# Patient Record
Sex: FEMALE | Employment: OTHER | URBAN - METROPOLITAN AREA
[De-identification: names, ages, dates, MRNs, and addresses within clinical notes are randomized per-mention and may not be internally consistent; named-entity substitution may affect disease eponyms.]

---

## 2017-07-05 ENCOUNTER — OFFICE VISIT (OUTPATIENT)
Dept: FAMILY MEDICINE | Facility: CLINIC | Age: 57
End: 2017-07-05
Payer: COMMERCIAL

## 2017-07-05 ENCOUNTER — RADIANT APPOINTMENT (OUTPATIENT)
Dept: GENERAL RADIOLOGY | Facility: CLINIC | Age: 57
End: 2017-07-05
Attending: FAMILY MEDICINE
Payer: COMMERCIAL

## 2017-07-05 VITALS
DIASTOLIC BLOOD PRESSURE: 73 MMHG | SYSTOLIC BLOOD PRESSURE: 111 MMHG | HEART RATE: 70 BPM | OXYGEN SATURATION: 98 % | WEIGHT: 150 LBS | BODY MASS INDEX: 24.99 KG/M2 | TEMPERATURE: 97.1 F | HEIGHT: 65 IN

## 2017-07-05 DIAGNOSIS — Z12.31 ENCOUNTER FOR SCREENING MAMMOGRAM FOR BREAST CANCER: ICD-10-CM

## 2017-07-05 DIAGNOSIS — S91.331A PUNCTURE WOUND OF FOOT, RIGHT, INITIAL ENCOUNTER: ICD-10-CM

## 2017-07-05 DIAGNOSIS — L03.115 CELLULITIS OF FOOT, RIGHT: ICD-10-CM

## 2017-07-05 DIAGNOSIS — M79.671 RIGHT FOOT PAIN: Primary | ICD-10-CM

## 2017-07-05 PROCEDURE — 99203 OFFICE O/P NEW LOW 30 MIN: CPT | Performed by: FAMILY MEDICINE

## 2017-07-05 PROCEDURE — 73620 X-RAY EXAM OF FOOT: CPT | Mod: RT

## 2017-07-05 RX ORDER — BUPROPION HYDROCHLORIDE 150 MG/1
150 TABLET ORAL
COMMUNITY
Start: 2017-04-04 | End: 2017-10-13

## 2017-07-05 RX ORDER — IBUPROFEN 200 MG
200-400 TABLET ORAL
COMMUNITY
End: 2021-03-11

## 2017-07-05 RX ORDER — CEPHALEXIN 500 MG/1
500 CAPSULE ORAL 2 TIMES DAILY
Qty: 20 CAPSULE | Refills: 0 | Status: SHIPPED | OUTPATIENT
Start: 2017-07-05 | End: 2017-07-15

## 2017-07-05 RX ORDER — PSEUDOEPHEDRINE HCL 30 MG
TABLET ORAL
COMMUNITY
End: 2018-11-23

## 2017-07-05 RX ORDER — FLUTICASONE PROPIONATE 50 MCG
2 SPRAY, SUSPENSION (ML) NASAL
COMMUNITY
Start: 2013-08-20 | End: 2023-02-03

## 2017-07-05 ASSESSMENT — ANXIETY QUESTIONNAIRES
5. BEING SO RESTLESS THAT IT IS HARD TO SIT STILL: NOT AT ALL
3. WORRYING TOO MUCH ABOUT DIFFERENT THINGS: NOT AT ALL
1. FEELING NERVOUS, ANXIOUS, OR ON EDGE: NOT AT ALL
IF YOU CHECKED OFF ANY PROBLEMS ON THIS QUESTIONNAIRE, HOW DIFFICULT HAVE THESE PROBLEMS MADE IT FOR YOU TO DO YOUR WORK, TAKE CARE OF THINGS AT HOME, OR GET ALONG WITH OTHER PEOPLE: NOT DIFFICULT AT ALL
6. BECOMING EASILY ANNOYED OR IRRITABLE: NOT AT ALL
7. FEELING AFRAID AS IF SOMETHING AWFUL MIGHT HAPPEN: NOT AT ALL
GAD7 TOTAL SCORE: 0
2. NOT BEING ABLE TO STOP OR CONTROL WORRYING: NOT AT ALL

## 2017-07-05 ASSESSMENT — PATIENT HEALTH QUESTIONNAIRE - PHQ9: 5. POOR APPETITE OR OVEREATING: NOT AT ALL

## 2017-07-05 NOTE — MR AVS SNAPSHOT
After Visit Summary   7/5/2017    Taisha Yanez    MRN: [id number]           Patient Information     Date Of Birth          1960        Visit Information        Provider Department      7/5/2017 11:00 AM Amparo Silveira MD [facility]        Today's Diagnoses     Right foot pain    -  1    Puncture wound of foot, right, initial encounter        Cellulitis of foot, right        Encounter for screening mammogram for breast cancer          Care Instructions      Discharge Instructions for Cellulitis  You have been diagnosed with cellulitis. This is an infection in the deepest layer of the skin. In some cases, the infection also affects the muscle. Cellulitis is caused by bacteria. The bacteria can enter the body through broken skin. This can happen with a cut, scratch, animal bite, or an insect bite that has been scratched. You may have been treated in the hospital with antibiotics and fluids. You will likely be given a prescription for antibiotics to take at home. This sheet will help you take care of yourself at home.  Home care  When you are home:    Take the prescribed antibiotic medicine you are given as directed until it is gone. Take it even if you feel better. It treats the infection and stops it from returning. Not taking all the medicine can make future infections hard to treat.    Keep the infected area clean.    When possible, raise the infected area above the level of your heart. This helps keep swelling down.    Talk with your healthcare provider if you are in pain. Ask what kind of over-the-counter medicine you can take for pain.    Apply clean bandages as advised.    Take your temperature once a day for a week.    Wash your hands often to prevent spreading the infection.  In the future, wash your hands before and after you touch cuts, scratches, or bandages. This will help prevent infection.   When to call your healthcare provider  Call your healthcare provider  immediately if you have any of the following:    Difficulty or pain when moving the joints above or below the infected area    Discharge or pus draining from the area    Fever of 100.4 F (38 C) or higher, or as directed by your healthcare provider    Pain that gets worse in or around the infected     Redness that gets worse in or around the infected area, particularly if the area of redness expands to a wider area    Shaking chills    Swelling of the infected area    Vomiting   Date Last Reviewed: 8/1/2016 2000-2017 [employer]. [address] [place] [address]. All rights reserved. This information is not intended as a substitute for professional medical care. Always follow your healthcare professional's instructions.                Follow-ups after your visit        Follow-up notes from your care team     Return if symptoms worsen or fail to improve, for Physical Exam at earliest convenience.      Future tests that were ordered for you today     Open Future Orders        Priority Expected Expires Ordered    *MA Screening Digital Bilateral Routine  7/5/2018 7/5/2017            Who to contact     Normal or non-critical lab and imaging results will be communicated to you by [employer], letter or phone within 4 business days after the clinic has received the results. If you do not hear from us within 7 days, please contact the clinic through [employer] or phone. If you have a critical or abnormal lab result, we will notify you by phone as soon as possible.  Submit refill requests through [employer] or call your pharmacy and they will forward the refill request to us. Please allow 3 business days for your refill to be completed.          If you need to speak with a  for additional information , please call: [phone]             Additional Information About Your Visit        [employer] Information     [employer] lets you send messages to your doctor, view your test results, renew your  "prescriptions, schedule appointments and more. To sign up, go to [email or web address] . Click on \"Log in\" on the left side of the screen, which will take you to the Welcome page. Then click on \"Sign up Now\" on the right side of the page.     You will be asked to enter the access code listed below, as well as some personal information. Please follow the directions to create your username and password.     Your access code is: [email or web address]  Expires: 10/3/2017 12:09 PM     Your access code will  in 90 days. If you need help or a new code, please call your [place] clinic or [phone].        Care EveryWhere ID     This is your Care EveryWhere ID. This could be used by other organizations to access your [place] medical records  [id number]        Your Vitals Were     Pulse Temperature Height Pulse Oximetry Breastfeeding? BMI (Body Mass Index)    70 97.1  F (36.2  C) (Tympanic) 5' 5\" (1.651 m) 98% No 24.96 kg/m2       Blood Pressure from Last 3 Encounters:   17 111/73    Weight from Last 3 Encounters:   17 150 lb (68 kg)              We Performed the Following     DEPRESSION ACTION PLAN (DAP)     XR Foot Right 2 Views          Today's Medication Changes          These changes are accurate as of: 17 12:09 PM.  If you have any questions, ask your nurse or doctor.               Start taking these medicines.        Dose/Directions    cephALEXin 500 MG capsule   Commonly known as:  KEFLEX   Used for:  Cellulitis of foot, right   Started by:  Amparo Silveira MD        Dose:  500 mg   Take 1 capsule (500 mg) by mouth 2 times daily for 10 days   Quantity:  20 capsule   Refills:  0            Where to get your medicines      These medications were sent to [id number] - [place] [[facility] [place] [address] [facility]  [address]  [place] [address]     Phone:  [phone]     cephALEXin 500 MG capsule                Primary Care Provider Office Phone # Fax #    [facility] " [facility] [phone]       [address] [facility] [facility] [employer] [id number]        Equal Access to Services     YARELY HAYNES : Hadii aad ku hadradha Junior, waaaronda luqrufina, lolitata kakandyda wallace, hugh mendezadan rose. So [facility] [phone].    ATENCIÓN: Si habla español, tiene a saini disposición servicios gratuitos de asistencia lingüística. Llame al [phone].    We comply with applicable federal civil rights laws and [place] laws. We do not discriminate on the basis of race, color, national origin, age, disability sex, sexual orientation or gender identity.            Thank you!     Thank you for choosing [facility]  for your care. Our goal is always to provide you with excellent care. Hearing back from our patients is one way we can continue to improve our services. Please take a few minutes to complete the written survey that you may receive in the mail after your visit with us. Thank you!             Your Updated Medication List - Protect others around you: Learn how to safely use, store and throw away your medicines at www.disposemymeds.org.          This list is accurate as of: 7/5/17 12:09 PM.  Always use your most recent med list.                   Brand Name Dispense Instructions for use Diagnosis    buPROPion 150 MG 24 hr tablet    WELLBUTRIN XL     Take 150 mg by mouth    Encounter for screening mammogram for breast cancer       cephALEXin 500 MG capsule    KEFLEX    20 capsule    Take 1 capsule (500 mg) by mouth 2 times daily for 10 days    Cellulitis of foot, right       FLUoxetine 20 MG capsule    PROzac     Take 40 mg by mouth    Encounter for screening mammogram for breast cancer       fluticasone 50 MCG/ACT spray    FLONASE     2 sprays    Encounter for screening mammogram for breast cancer       ibuprofen 200 MG tablet    ADVIL/MOTRIN     Take 200-400 mg by mouth    Encounter for screening mammogram for breast cancer       pseudoePHEDrine  30 MG tablet    SUDAFED      Encounter for screening mammogram for breast cancer

## 2017-07-05 NOTE — NURSING NOTE
"Chief Complaint   Patient presents with     Puncture Wound       Initial /73  Pulse 70  Temp 97.1  F (36.2  C) (Tympanic)  Ht 5' 5\" (1.651 m)  Wt 150 lb (68 kg)  SpO2 98%  Breastfeeding? No  BMI 24.96 kg/m2 Estimated body mass index is 24.96 kg/(m^2) as calculated from the following:    Height as of this encounter: 5' 5\" (1.651 m).    Weight as of this encounter: 150 lb (68 kg).  Medication Reconciliation: complete     Health Maintenance:  Pt report she no longer needs to complete pap smears; due to hysterectomy.  Mammogram was last completed 2015, is aware she is over due; usually has done between August and September.  Pt accepted having referral order placed for mammogram.  Colonoscopy done 2009- report available in care everywhere- is up to date; chart was routed to be abstracted.      Olga Rossi MA      "

## 2017-07-05 NOTE — LETTER
My Depression Action Plan  Name: Taisha Yanez   Date of Birth 1960  Date: 7/5/2017    [facility]   My clinic: [facility] [place]  [address]  [place] [id number] [phone]          GREEN    ZONE   Good Control    What it looks like:     Things are going generally well. You have normal up s and down s. You may even feel depressed from time to time, but bad moods usually last less than a day.   What you need to do:  1. Continue to care for yourself (see self care plan)  2. Check your depression survival kit and update it as needed  3. Follow your physician s recommendations including any medication.  4. Do not stop taking medication unless you consult with your physician first.           YELLOW         ZONE Getting Worse    What it looks like:     Depression is starting to interfere with your life.     It may be hard to get out of bed; you may be starting to isolate yourself from others.    Symptoms of depression are starting to last most all day and this has happened for several days.     You may have suicidal thoughts but they are not constant.   What you need to do:     1. Call your care team, your response to treatment will improve if you keep your care team informed of your progress. Yellow periods are signs an adjustment may need to be made.     2. Continue your self-care, even if you have to fake it!    3. Talk to someone in your support network    4. Open up your depression survival kit           RED    ZONE Medical Alert - Get Help    What it looks like:     Depression is seriously interfering with your life.     You may experience these or other symptoms: You can t get out of bed most days, can t work or engage in other necessary activities, you have trouble taking care of basic hygiene, or basic responsibilities, thoughts of suicide or death that will not go away, self-injurious behavior.     What you need to do:  1. Call  your care team and request a same-day appointment. If they are not available (weekends or after hours) call your local crisis line, emergency room or 911.      Electronically signed by: Olga Rossi, July 5, 2017    Depression Self Care Plan / Survival Kit    Self-Care for Depression  Here s the deal. Your body and mind are really not as separate as most people think.  What you do and think affects how you feel and how you feel influences what you do and think. This means if you do things that people who feel good do, it will help you feel better.  Sometimes this is all it takes.  There is also a place for medication and therapy depending on how severe your depression is, so be sure to consult with your medical provider and/ or Behavioral Health Consultant if your symptoms are worsening or not improving.     In order to better manage my stress, I will:    Exercise  Get some form of exercise, every day. This will help reduce pain and release endorphins, the  feel good  chemicals in your brain. This is almost as good as taking antidepressants!  This is not the same as joining a gym and then never going! (they count on that by the way ) It can be as simple as just going for a walk or doing some gardening, anything that will get you moving.      Hygiene   Maintain good hygiene (Get out of bed in the morning, Make your bed, Brush your teeth, Take a shower, and Get dressed like you were going to work, even if you are unemployed).  If your clothes don't fit try to get ones that do.    Diet  I will strive to eat foods that are good for me, drink plenty of water, and avoid excessive sugar, caffeine, alcohol, and other mood-altering substances.  Some foods that are helpful in depression are: complex carbohydrates, B vitamins, flaxseed, fish or fish oil, fresh fruits and vegetables.    Psychotherapy  I agree to participate in Individual Therapy (if recommended).    Medication  If prescribed medications, I agree to  take them.  Missing doses can result in serious side effects.  I understand that drinking alcohol, or other illicit drug use, may cause potential side effects.  I will not stop my medication abruptly without first discussing it with my provider.    Staying Connected With Others  I will stay in touch with my friends, family members, and my primary care provider/team.    Use your imagination  Be creative.  We all have a creative side; it doesn t matter if it s oil painting, sand castles, or mud pies! This will also kick up the endorphins.    Witness Beauty  (AKA stop and smell the roses) Take a look outside, even in mid-winter. Notice colors, textures. Watch the squirrels and birds.     Service to others  Be of service to others.  There is always someone else in need.  By helping others we can  get out of ourselves  and remember the really important things.  This also provides opportunities for practicing all the other parts of the program.    Humor  Laugh and be silly!  Adjust your TV habits for less news and crime-drama and more comedy.    Control your stress  Try breathing deep, massage therapy, biofeedback, and meditation. Find time to relax each day.     My support system    Clinic Contact:  Phone number:    Contact 1:  Phone number:    Contact 2:  Phone number:    Christianity/:  Phone number:    Therapist:  Phone number:    Local crisis center:    Phone number:    Other community support:  Phone number:

## 2017-07-05 NOTE — PROGRESS NOTES
"  SUBJECTIVE:                                                    Taisha Yanez is a 56 year old female who presents to clinic today for the following health issues:    New to the clinic.     Puncture Wound  Stepped on nail, bottom of right foot x1 day ago   Swelling started today, pain with pressure, stiffness in toes, and redness.   TDAP is up to date: 3/17/15     States that she was wearing flip flops when it happened and the nail went through the flip flop and punctured the bottom of her foot. Thinks that about 3 cm of the nail went through. Was able to walk away from the scene without the nail without the nail embedded in the skin.     Denies having any fever or chills.     HEALTH CARE MAINTENANCE: new to the clinic; overdue for screening tests.     Problem list and histories reviewed & adjusted, as indicated.  Additional history: as documented    Current Outpatient Prescriptions   Medication Sig Dispense Refill     FLUoxetine (PROZAC) 20 MG capsule Take 40 mg by mouth       buPROPion (WELLBUTRIN XL) 150 MG 24 hr tablet Take 150 mg by mouth       pseudoePHEDrine (SUDAFED) 30 MG tablet        fluticasone (FLONASE) 50 MCG/ACT spray 2 sprays       ibuprofen (ADVIL/MOTRIN) 200 MG tablet Take 200-400 mg by mouth       cephALEXin (KEFLEX) 500 MG capsule Take 1 capsule (500 mg) by mouth 2 times daily for 10 days 20 capsule 0     Allergies   Allergen Reactions     Codeine        Reviewed and updated as needed this visit by clinical staff       Reviewed and updated as needed this visit by Provider         ROS:  Constitutional, HEENT, cardiovascular, pulmonary, gi and gu systems are negative, except as otherwise noted.    OBJECTIVE:     /73  Pulse 70  Temp 97.1  F (36.2  C) (Tympanic)  Ht 5' 5\" (1.651 m)  Wt 150 lb (68 kg)  SpO2 98%  Breastfeeding? No  BMI 24.96 kg/m2  Body mass index is 24.96 kg/(m^2).  GENERAL: healthy, alert and no distress  RIGHT FOOT: Puncture wound on the plantar aspect of the foot " with tenderness to palpation but no discharge. Erythematous anterior aspect of the foot, warm to touch. Pedal pulse present. No cyanosis. FROM    Diagnostic Test Results:  Reviewed and discussed with patient prior to discharge.  Results for orders placed or performed in visit on 07/05/17   XR Foot Right 2 Views    Narrative    XR FOOT RT 2 VW 7/5/2017 11:45 AM    COMPARISON: None.    HISTORY: Foot pain.      Impression    IMPRESSION: No fractures are seen in the right foot. Joints are  preserved and in normal alignment. Achilles enthesopathy is noted.    SINDHU BHARDWAJ         ASSESSMENT/PLAN:     Taisha was seen today for puncture wound.    Diagnoses and all orders for this visit:    Right foot pain  -     XR Foot Right 2 Views    Puncture wound of foot, right, initial encounter  -     XR Foot Right 2 Views    Cellulitis of foot, right  -     cephALEXin (KEFLEX) 500 MG capsule; Take 1 capsule (500 mg) by mouth 2 times daily for 10 days    Patient education and Handout with home care instructions given    Encounter for screening mammogram for breast cancer  -     *MA Screening Digital Bilateral; Future    Other orders  -     DEPRESSION ACTION PLAN (DAP)           Follow up if symptoms fail to improve or worsen.      The patient was in agreement with the plan today and had no questions or concerns prior to leaving the clinic.    Schedule a Physical Exam at earliest convenience.       Amparo Silveira MD  [facility]

## 2017-07-05 NOTE — Clinical Note
Please abstract the following data from this visit with this patient into the appropriate field in Epic:  Colonoscopy done on this date: 5/9/09 (approximately), by this group: [facility], results were NORMAL- REPEAT Q10Y.   Colonoscopy is up to date; please abstract.  Thank you.   Olga Rossi MA

## 2017-07-05 NOTE — PATIENT INSTRUCTIONS
Discharge Instructions for Cellulitis  You have been diagnosed with cellulitis. This is an infection in the deepest layer of the skin. In some cases, the infection also affects the muscle. Cellulitis is caused by bacteria. The bacteria can enter the body through broken skin. This can happen with a cut, scratch, animal bite, or an insect bite that has been scratched. You may have been treated in the hospital with antibiotics and fluids. You will likely be given a prescription for antibiotics to take at home. This sheet will help you take care of yourself at home.  Home care  When you are home:    Take the prescribed antibiotic medicine you are given as directed until it is gone. Take it even if you feel better. It treats the infection and stops it from returning. Not taking all the medicine can make future infections hard to treat.    Keep the infected area clean.    When possible, raise the infected area above the level of your heart. This helps keep swelling down.    Talk with your healthcare provider if you are in pain. Ask what kind of over-the-counter medicine you can take for pain.    Apply clean bandages as advised.    Take your temperature once a day for a week.    Wash your hands often to prevent spreading the infection.  In the future, wash your hands before and after you touch cuts, scratches, or bandages. This will help prevent infection.   When to call your healthcare provider  Call your healthcare provider immediately if you have any of the following:    Difficulty or pain when moving the joints above or below the infected area    Discharge or pus draining from the area    Fever of 100.4 F (38 C) or higher, or as directed by your healthcare provider    Pain that gets worse in or around the infected     Redness that gets worse in or around the infected area, particularly if the area of redness expands to a wider area    Shaking chills    Swelling of the infected area    Vomiting   Date Last Reviewed:  8/1/2016 2000-2017 [employer]. [address] [place] [address], [place] [address]. All rights reserved. This information is not intended as a substitute for professional medical care. Always follow your healthcare professional's instructions.

## 2017-07-06 ASSESSMENT — PATIENT HEALTH QUESTIONNAIRE - PHQ9: SUM OF ALL RESPONSES TO PHQ QUESTIONS 1-9: 2

## 2017-07-06 ASSESSMENT — ANXIETY QUESTIONNAIRES: GAD7 TOTAL SCORE: 0

## 2017-10-13 ENCOUNTER — OFFICE VISIT (OUTPATIENT)
Dept: FAMILY MEDICINE | Facility: CLINIC | Age: 57
End: 2017-10-13
Payer: COMMERCIAL

## 2017-10-13 VITALS
SYSTOLIC BLOOD PRESSURE: 112 MMHG | HEIGHT: 65 IN | DIASTOLIC BLOOD PRESSURE: 77 MMHG | TEMPERATURE: 98.3 F | HEART RATE: 72 BPM

## 2017-10-13 DIAGNOSIS — Z00.00 ROUTINE GENERAL MEDICAL EXAMINATION AT A HEALTH CARE FACILITY: Primary | ICD-10-CM

## 2017-10-13 DIAGNOSIS — Z12.31 ENCOUNTER FOR SCREENING MAMMOGRAM FOR BREAST CANCER: ICD-10-CM

## 2017-10-13 DIAGNOSIS — G56.20 LESION OF ULNAR NERVE, UNSPECIFIED LATERALITY: ICD-10-CM

## 2017-10-13 DIAGNOSIS — R10.9 ABDOMINAL SPASMS: ICD-10-CM

## 2017-10-13 DIAGNOSIS — F32.1 MODERATE MAJOR DEPRESSION (H): ICD-10-CM

## 2017-10-13 DIAGNOSIS — M26.609 TMJ (TEMPOROMANDIBULAR JOINT SYNDROME): ICD-10-CM

## 2017-10-13 DIAGNOSIS — H92.01 OTALGIA, RIGHT: ICD-10-CM

## 2017-10-13 DIAGNOSIS — Z23 NEED FOR PROPHYLACTIC VACCINATION AND INOCULATION AGAINST INFLUENZA: ICD-10-CM

## 2017-10-13 DIAGNOSIS — J32.9 CHRONIC SINUSITIS, UNSPECIFIED LOCATION: ICD-10-CM

## 2017-10-13 DIAGNOSIS — F41.9 ANXIETY: ICD-10-CM

## 2017-10-13 LAB — HCV AB SERPL QL IA: NONREACTIVE

## 2017-10-13 PROCEDURE — 90471 IMMUNIZATION ADMIN: CPT | Performed by: PHYSICIAN ASSISTANT

## 2017-10-13 PROCEDURE — 99213 OFFICE O/P EST LOW 20 MIN: CPT | Mod: 25 | Performed by: PHYSICIAN ASSISTANT

## 2017-10-13 PROCEDURE — 86803 HEPATITIS C AB TEST: CPT | Performed by: PHYSICIAN ASSISTANT

## 2017-10-13 PROCEDURE — 99396 PREV VISIT EST AGE 40-64: CPT | Mod: 25 | Performed by: PHYSICIAN ASSISTANT

## 2017-10-13 PROCEDURE — 90686 IIV4 VACC NO PRSV 0.5 ML IM: CPT | Performed by: PHYSICIAN ASSISTANT

## 2017-10-13 PROCEDURE — 36415 COLL VENOUS BLD VENIPUNCTURE: CPT | Performed by: PHYSICIAN ASSISTANT

## 2017-10-13 RX ORDER — BUPROPION HYDROCHLORIDE 150 MG/1
150 TABLET ORAL EVERY MORNING
Qty: 90 TABLET | Refills: 1 | Status: SHIPPED | OUTPATIENT
Start: 2017-10-13 | End: 2018-04-16

## 2017-10-13 RX ORDER — CYCLOBENZAPRINE HCL 10 MG
10 TABLET ORAL 2 TIMES DAILY PRN
Qty: 42 TABLET | Refills: 0 | Status: SHIPPED | OUTPATIENT
Start: 2017-10-13 | End: 2019-12-16

## 2017-10-13 RX ORDER — DICYCLOMINE HCL 20 MG
TABLET ORAL
Qty: 90 TABLET | Refills: 1 | Status: SHIPPED | OUTPATIENT
Start: 2017-10-13 | End: 2019-12-16

## 2017-10-13 RX ORDER — FLUOXETINE 40 MG/1
40 CAPSULE ORAL DAILY
Qty: 90 CAPSULE | Refills: 1 | Status: SHIPPED | OUTPATIENT
Start: 2017-10-13 | End: 2018-04-16

## 2017-10-13 RX ORDER — DICYCLOMINE HCL 20 MG
TABLET ORAL
COMMUNITY
Start: 2015-02-04 | End: 2017-10-13

## 2017-10-13 RX ORDER — CYCLOBENZAPRINE HCL 10 MG
10 TABLET ORAL
COMMUNITY
Start: 2016-03-31 | End: 2017-10-13

## 2017-10-13 NOTE — PROGRESS NOTES
Injectable Influenza Immunization Documentation    1.  Is the person to be vaccinated sick today?   No    2. Does the person to be vaccinated have an allergy to a component   of the vaccine?   No    3. Has the person to be vaccinated ever had a serious reaction   to influenza vaccine in the past?   No    4. Has the person to be vaccinated ever had Guillain-Barré syndrome?   No    Form completed by Kylee Johnson MA

## 2017-10-13 NOTE — Clinical Note
Please abstract the following data from this visit with this patient into the appropriate field in Epic:  Colonoscopy done on this date: 5/4/2009 (approximately), by this group: [facility] , results were Normal Colonoscopy every 10 years.

## 2017-10-13 NOTE — PROGRESS NOTES
SUBJECTIVE:   CC: Taisha Yanez is an 56 year old woman who presents for preventive health visit.     Healthy Habits:    Do you get at least three servings of calcium containing foods daily (dairy, green leafy vegetables, etc.)? yes    Amount of exercise or daily activities, outside of work: 5 day(s) per week    Problems taking medications regularly No    Medication side effects: No    Have you had an eye exam in the past two years? unsure    Do you see a dentist twice per year? no    Do you have sleep apnea, excessive snoring or daytime drowsiness?yes, waking during the night and tightness in muscles and ache in feet       Pt is having right ear pain and is suspecting its related to clenching her jaw/TMJ    Right foot and calves achy for the past few months.  Some stretching and warm baths help.  ibuprofen 400 mg TID when pain is severe.  Takes it with food.      She also has a h/o ulnar neuropathy and tendonitis.      Today's PHQ-2 Score:   PHQ-2 ( 1999 Pfizer) 7/5/2017   Q1: Little interest or pleasure in doing things 0   Q2: Feeling down, depressed or hopeless 0   PHQ-2 Score 0         Abuse: Current or Past(Physical, Sexual or Emotional)- No  Do you feel safe in your environment - Yes  Social History   Substance Use Topics     Smoking status: Never Smoker     Smokeless tobacco: Not on file     Alcohol use Yes     The patient does not drink >3 drinks per day nor >7 drinks per week.    Reviewed orders with patient.  Reviewed health maintenance and updated orders accordingly - Yes  Labs reviewed in EPIC  BP Readings from Last 3 Encounters:   10/13/17 112/77   07/05/17 111/73    Wt Readings from Last 3 Encounters:   07/05/17 150 lb (68 kg)                  Current Outpatient Prescriptions   Medication Sig Dispense Refill     RANITIDINE HCL PO        FLUoxetine (PROZAC) 40 MG capsule Take 1 capsule (40 mg) by mouth daily 90 capsule 1     dicyclomine (BENTYL) 20 MG tablet TAKE ONE TABLET BY MOUTH THREE TIMES A  "DAY WITH MEALS AS NEEDED. MAY ALSO TAKE BEFORE BED 90 tablet 1     buPROPion (WELLBUTRIN XL) 150 MG 24 hr tablet Take 1 tablet (150 mg) by mouth every morning 90 tablet 1     cyclobenzaprine (FLEXERIL) 10 MG tablet Take 1 tablet (10 mg) by mouth 2 times daily as needed for muscle spasms 42 tablet 0     fluticasone (FLONASE) 50 MCG/ACT spray 2 sprays       ibuprofen (ADVIL/MOTRIN) 200 MG tablet Take 200-400 mg by mouth       pseudoePHEDrine (SUDAFED) 30 MG tablet        [DISCONTINUED] FLUoxetine (PROZAC) 20 MG capsule Take 40 mg by mouth       [DISCONTINUED] buPROPion (WELLBUTRIN XL) 150 MG 24 hr tablet Take 150 mg by mouth           Patient over age 50, mutual decision to screen reflected in health maintenance.      Pertinent mammograms are reviewed under the imaging tab.  History of abnormal Pap smear: Status post benign hysterectomy. Health Maintenance and Surgical History updated.    Reviewed and updated as needed this visit by clinical staff  Allergies  Meds  Problems  Med Hx  Surg Hx  Fam Hx  Soc Hx        Reviewed and updated as needed this visit by Provider  Allergies  Meds  Problems              ROS:  C: NEGATIVE for fever, chills, change in weight  I: NEGATIVE for worrisome rashes, moles or lesions  E: NEGATIVE for vision changes or irritation  ENT: NEGATIVE for ear, mouth and throat problems  R: NEGATIVE for significant cough or SOB  B: NEGATIVE for masses, tenderness or discharge  CV: NEGATIVE for chest pain, palpitations or peripheral edema  GI: NEGATIVE for nausea, abdominal pain, heartburn, or change in bowel habits  : NEGATIVE for unusual urinary or vaginal symptoms. No vaginal bleeding.  M: NEGATIVE for significant arthralgias or myalgia  N: NEGATIVE for weakness, dizziness or paresthesias  P: NEGATIVE for changes in mood or affect     OBJECTIVE:   /77  Pulse 72  Temp 98.3  F (36.8  C) (Tympanic)  Ht 5' 5\" (1.651 m)  Breastfeeding? No  EXAM:  GENERAL APPEARANCE: healthy, alert " and no distress  EYES: Eyes grossly normal to inspection, PERRL and conjunctivae and sclerae normal  HENT: ear canals and TM's normal, nose and mouth without ulcers or lesions, oropharynx clear and oral mucous membranes moist  NECK: no adenopathy, no asymmetry, masses, or scars and thyroid normal to palpation  RESP: lungs clear to auscultation - no rales, rhonchi or wheezes  BREAST: normal without masses, tenderness or nipple discharge and no palpable axillary masses or adenopathy  CV: regular rate and rhythm, normal S1 S2, no S3 or S4, no murmur, click or rub, no peripheral edema and peripheral pulses strong  ABDOMEN: soft, nontender, no hepatosplenomegaly, no masses and bowel sounds normal   (female): normal female external genitalia, normal urethral meatus, vaginal mucosal atrophy noted, uterus surgically absent. No adnexal mass noted.  MS: no musculoskeletal defects are noted and gait is age appropriate without ataxia  SKIN: no suspicious lesions or rashes  NEURO: Normal strength and tone, sensory exam grossly normal, mentation intact and speech normal  PSYCH: mentation appears normal and affect normal/bright    ASSESSMENT/PLAN:   1. Routine general medical examination at a health care facility       HEALTH CARE MAINTENANCE              Reviewed USPTF recommendations and anticipatory guidance.              See orders.     - **Hepatitis C Screen Reflex to RNA FUTURE anytime    2. Encounter for screening mammogram for breast cancer  She will schedule this soon.   - *MA Screening Digital Bilateral; Future    3. Moderate major depression (H)  Stable, has been on these meds for years.  Last PHQ-9 stable.   - FLUoxetine (PROZAC) 40 MG capsule; Take 1 capsule (40 mg) by mouth daily  Dispense: 90 capsule; Refill: 1  - buPROPion (WELLBUTRIN XL) 150 MG 24 hr tablet; Take 1 tablet (150 mg) by mouth every morning  Dispense: 90 tablet; Refill: 1    4. Anxiety  Stable.     5. TMJ (temporomandibular joint syndrome)  She has  "a retainer at home that she uses.      Recommended a soft diet, prn NSAID's and Dental consult. Explained nature of TMJ syndrome, treatment modalities and insurance coverage issues.     She has seen a TMJ specialist in the past and does not want a second opinion.    - cyclobenzaprine (FLEXERIL) 10 MG tablet; Take 1 tablet (10 mg) by mouth 2 times daily as needed for muscle spasms  Dispense: 42 tablet; Refill: 0    6. Chronic sinusitis, unspecified location  Treated with flonase PRN    7. Lesion of ulnar nerve, unspecified laterality  Discussed starting gabapentin today for chronic pains.  She will consider.     8. Abdominal spasms  Use prn  - RANITIDINE HCL PO;   - dicyclomine (BENTYL) 20 MG tablet; TAKE ONE TABLET BY MOUTH THREE TIMES A DAY WITH MEALS AS NEEDED. MAY ALSO TAKE BEFORE BED  Dispense: 90 tablet; Refill: 1    9. Otalgia, right  Due to TMJ    10. Need for prophylactic vaccination and inoculation against influenza  due  - FLU VAC, SPLIT VIRUS IM > 3 YO (QUADRIVALENT) [48965]  - Vaccine Administration, Initial [73504]    COUNSELING:   Reviewed preventive health counseling, as reflected in patient instructions       Regular exercise       Healthy diet/nutrition         reports that she has never smoked. She does not have any smokeless tobacco history on file.    Estimated body mass index is 24.96 kg/(m^2) as calculated from the following:    Height as of 7/5/17: 5' 5\" (1.651 m).    Weight as of 7/5/17: 150 lb (68 kg).         Counseling Resources:  ATP IV Guidelines  Pooled Cohorts Equation Calculator  Breast Cancer Risk Calculator  FRAX Risk Assessment  ICSI Preventive Guidelines  Dietary Guidelines for Americans, 2010  USDA's MyPlate  ASA Prophylaxis  Lung CA Screening    Cathryn Hills PA-C  [facility]  "

## 2017-10-13 NOTE — MR AVS SNAPSHOT
After Visit Summary   10/13/2017    Taisha Yanez    MRN: [id number]           Patient Information     Date Of Birth          1960        Visit Information        Provider Department      10/13/2017 7:20 AM Cathryn Hills PA-C [facility]        Today's Diagnoses     Moderate major depression (H)    -  1    Encounter for screening mammogram for breast cancer        Anxiety        TMJ (temporomandibular joint syndrome)        Chronic sinusitis, unspecified location        Lesion of ulnar nerve, unspecified laterality        Abdominal spasms        Routine general medical examination at a health care facility        Otalgia, right          Care Instructions      Preventive Health Recommendations  Female Ages 50 - 64    Yearly exam: See your health care provider every year in order to  o Review health changes.   o Discuss preventive care.    o Review your medicines if your doctor has prescribed any.      Get a Pap test every three years (unless you have an abnormal result and your provider advises testing more often).    If you get Pap tests with HPV test, you only need to test every 5 years, unless you have an abnormal result.     You do not need a Pap test if your uterus was removed (hysterectomy) and you have not had cancer.    You should be tested each year for STDs (sexually transmitted diseases) if you're at risk.     Have a mammogram every 1 to 2 years.    Have a colonoscopy at age 50, or have a yearly FIT test (stool test). These exams screen for colon cancer.      Have a cholesterol test every 5 years, or more often if advised.    Have a diabetes test (fasting glucose) every three years. If you are at risk for diabetes, you should have this test more often.     If you are at risk for osteoporosis (brittle bone disease), think about having a bone density scan (DEXA).    Shots: Get a flu shot each year. Get a tetanus shot every 10 years.    Nutrition:     Eat at least 5  "servings of fruits and vegetables each day.    Eat whole-grain bread, whole-wheat pasta and brown rice instead of white grains and rice.    Talk to your provider about Calcium and Vitamin D.     Lifestyle    Exercise at least 150 minutes a week (30 minutes a day, 5 days a week). This will help you control your weight and prevent disease.    Limit alcohol to one drink per day.    No smoking.     Wear sunscreen to prevent skin cancer.     See your dentist every six months for an exam and cleaning.    See your eye doctor every 1 to 2 years.            Follow-ups after your visit        Future tests that were ordered for you today     Open Future Orders        Priority Expected Expires Ordered    *MA Screening Digital Bilateral Routine  10/13/2018 10/13/2017            Who to contact     Normal or non-critical lab and imaging results will be communicated to you by [employer], letter or phone within 4 business days after the clinic has received the results. If you do not hear from us within 7 days, please contact the clinic through [employer] or phone. If you have a critical or abnormal lab result, we will notify you by phone as soon as possible.  Submit refill requests through [employer] or call your pharmacy and they will forward the refill request to us. Please allow 3 business days for your refill to be completed.          If you need to speak with a  for additional information , please call: [phone]           Additional Information About Your Visit        [employer] Information     [employer] lets you send messages to your doctor, view your test results, renew your prescriptions, schedule appointments and more. To sign up, go to [email or web address] . Click on \"Log in\" on the left side of the screen, which will take you to the Welcome page. Then click on \"Sign up Now\" on the right side of the page.     You will be asked to enter the access code listed below, as well as some personal information. Please " "follow the directions to create your username and password.     Your access code is: [email or web address]  Expires: 2018  8:15 AM     Your access code will  in 90 days. If you need help or a new code, please call your [facility] clinic or [phone].        Care EveryWhere ID     This is your Care EveryWhere ID. This could be used by other organizations to access your [facility] medical records  [id number]        Your Vitals Were     Pulse Temperature Height Breastfeeding?          72 98.3  F (36.8  C) (Tympanic) 5' 5\" (1.651 m) No         Blood Pressure from Last 3 Encounters:   10/13/17 112/77   17 111/73    Weight from Last 3 Encounters:   17 150 lb (68 kg)              We Performed the Following     **Hepatitis C Screen Reflex to RNA FUTURE anytime          Today's Medication Changes          These changes are accurate as of: 10/13/17  8:15 AM.  If you have any questions, ask your nurse or doctor.               These medicines have changed or have updated prescriptions.        Dose/Directions    buPROPion 150 MG 24 hr tablet   Commonly known as:  WELLBUTRIN XL   This may have changed:  when to take this   Used for:  Encounter for screening mammogram for breast cancer   Changed by:  Cathryn Hills PA-C        Dose:  150 mg   Take 1 tablet (150 mg) by mouth every morning   Quantity:  90 tablet   Refills:  1       dicyclomine 20 MG tablet   Commonly known as:  BENTYL   This may have changed:  See the new instructions.   Used for:  Abdominal spasms   Changed by:  Cathryn Hills PA-C        TAKE ONE TABLET BY MOUTH THREE TIMES A DAY WITH MEALS AS NEEDED. MAY ALSO TAKE BEFORE BED   Quantity:  90 tablet   Refills:  1       FLUoxetine 40 MG capsule   Commonly known as:  PROzac   This may have changed:    - medication strength  - when to take this   Used for:  Encounter for screening mammogram for breast cancer   Changed by:  Cathryn Hills PA-C        Dose:  40 mg   Take 1 capsule (40 mg) " by mouth daily   Quantity:  90 capsule   Refills:  1            Where to get your medicines      These medications were sent to [id number] - [place] [[place] [place] - [address]  [address] [place] [address]     Phone:  [phone]     buPROPion 150 MG 24 hr tablet    dicyclomine 20 MG tablet    FLUoxetine 40 MG capsule                Primary Care Provider Office Phone # Fax #    [facility] [phone]       [address] [address]        Equal Access to Services     [facility]АНА : Hadii aad ku hadasho Soomaali, waaxda luqadaha, qaybta kaalmada adeegyada, waxaurora kelleyin hayadan vu . [facility] [phone].    ATENCIÓN: Si habla español, tiene a saini disposición servicios gratuitos de asistencia lingüística. [facility] [phone].    We comply with applicable federal civil rights laws and [place] laws. We do not discriminate on the basis of race, color, national origin, age, disability, sex, sexual orientation, or gender identity.            Thank you!     Thank you for choosing [facility]  for your care. Our goal is always to provide you with excellent care. Hearing back from our patients is one way we can continue to improve our services. Please take a few minutes to complete the written survey that you may receive in the mail after your visit with us. Thank you!             Your Updated Medication List - Protect others around you: Learn how to safely use, store and throw away your medicines at www.disposemymeds.org.          This list is accurate as of: 10/13/17  8:15 AM.  Always use your most recent med list.                   Brand Name Dispense Instructions for use Diagnosis    buPROPion 150 MG 24 hr tablet    WELLBUTRIN XL    90 tablet    Take 1 tablet (150 mg) by mouth every morning    Encounter for screening mammogram for breast cancer       cyclobenzaprine 10 MG tablet    FLEXERIL      Take 10 mg by mouth        dicyclomine 20 MG tablet    BENTYL    90 tablet    TAKE ONE TABLET BY MOUTH THREE TIMES A DAY WITH MEALS AS NEEDED. MAY ALSO TAKE BEFORE BED    Abdominal spasms       FLUoxetine 40 MG capsule    PROzac    90 capsule    Take 1 capsule (40 mg) by mouth daily    Encounter for screening mammogram for breast cancer       fluticasone 50 MCG/ACT spray    FLONASE     2 sprays    Encounter for screening mammogram for breast cancer       ibuprofen 200 MG tablet    ADVIL/MOTRIN     Take 200-400 mg by mouth    Encounter for screening mammogram for breast cancer       pseudoePHEDrine 30 MG tablet    SUDAFED      Encounter for screening mammogram for breast cancer       RANITIDINE HCL PO

## 2017-10-13 NOTE — NURSING NOTE
"Chief Complaint   Patient presents with     Physical       Initial /77  Pulse 72  Temp 98.3  F (36.8  C) (Tympanic)  Ht 5' 5\" (1.651 m)  Breastfeeding? No Estimated body mass index is 24.96 kg/(m^2) as calculated from the following:    Height as of 7/5/17: 5' 5\" (1.651 m).    Weight as of 7/5/17: 150 lb (68 kg).  Medication Reconciliation: complete     Kylee Johnson MA      "

## 2017-12-01 DIAGNOSIS — Z12.31 ENCOUNTER FOR SCREENING MAMMOGRAM FOR BREAST CANCER: ICD-10-CM

## 2017-12-01 PROCEDURE — G0202 SCR MAMMO BI INCL CAD: HCPCS | Mod: TC

## 2018-01-31 ENCOUNTER — OFFICE VISIT (OUTPATIENT)
Dept: NEUROSURGERY | Facility: CLINIC | Age: 58
End: 2018-01-31
Attending: NURSE PRACTITIONER
Payer: COMMERCIAL

## 2018-01-31 ENCOUNTER — RADIANT APPOINTMENT (OUTPATIENT)
Dept: GENERAL RADIOLOGY | Facility: CLINIC | Age: 58
End: 2018-01-31
Attending: NURSE PRACTITIONER
Payer: COMMERCIAL

## 2018-01-31 VITALS
OXYGEN SATURATION: 98 % | HEART RATE: 88 BPM | TEMPERATURE: 98.2 F | DIASTOLIC BLOOD PRESSURE: 71 MMHG | HEIGHT: 65 IN | SYSTOLIC BLOOD PRESSURE: 118 MMHG

## 2018-01-31 DIAGNOSIS — G89.29 CHRONIC LEFT-SIDED LOW BACK PAIN WITHOUT SCIATICA: ICD-10-CM

## 2018-01-31 DIAGNOSIS — M54.50 CHRONIC LEFT-SIDED LOW BACK PAIN WITHOUT SCIATICA: ICD-10-CM

## 2018-01-31 DIAGNOSIS — M54.50 CHRONIC LEFT-SIDED LOW BACK PAIN WITHOUT SCIATICA: Primary | ICD-10-CM

## 2018-01-31 DIAGNOSIS — G89.29 CHRONIC LEFT-SIDED LOW BACK PAIN WITHOUT SCIATICA: Primary | ICD-10-CM

## 2018-01-31 PROCEDURE — G0463 HOSPITAL OUTPT CLINIC VISIT: HCPCS | Performed by: NURSE PRACTITIONER

## 2018-01-31 PROCEDURE — 99203 OFFICE O/P NEW LOW 30 MIN: CPT | Performed by: NURSE PRACTITIONER

## 2018-01-31 PROCEDURE — 72100 X-RAY EXAM L-S SPINE 2/3 VWS: CPT

## 2018-01-31 ASSESSMENT — PAIN SCALES - GENERAL: PAINLEVEL: NO PAIN (0)

## 2018-01-31 NOTE — LETTER
"    1/31/2018         RE: Taisha Yanez  [address]  [place] [facility] [address]        Dear Colleague,    Thank you for referring your patient, Taisha Yanez, to the [facility]. Please see a copy of my visit note below.    Dr. Francis So  [facility]  Neurosurgery Clinic Visit      CC: Low back pain    Primary care Provider: Cathryn Hills      Reason For Visit:   The patient presents for evaluation of low back pain.      HPI: Taisha Yanez is a 57 year old female with increased low back pain that started approximately 2 months ago with no precipitating cause . She states, \"I've had back issues all of my life, but nothing like this.\"   She describes her pain as a constant dull pain to the left lower back which radiates into the left buttock.  She denies leg pain.  She denies BLE weakness or foot drop.  She states her pain generally worsens with prolonged standing and walking, \"And when I twist a certain way.\"  She finds some relief with sitting, \"But not too long.\"  She has not had injections or PT.       Pain right now:  2    History reviewed. No pertinent past medical history.    Past Medical History reviewed with patient during visit.    Past Surgical History:   Procedure Laterality Date     HYSTERECTOMY, PAP NO LONGER INDICATED       Past Surgical History reviewed with patient during visit.    Current Outpatient Prescriptions   Medication     RANITIDINE HCL PO     FLUoxetine (PROZAC) 40 MG capsule     dicyclomine (BENTYL) 20 MG tablet     buPROPion (WELLBUTRIN XL) 150 MG 24 hr tablet     cyclobenzaprine (FLEXERIL) 10 MG tablet     pseudoePHEDrine (SUDAFED) 30 MG tablet     fluticasone (FLONASE) 50 MCG/ACT spray     ibuprofen (ADVIL/MOTRIN) 200 MG tablet     No current facility-administered medications for this visit.        Allergies   Allergen Reactions     Codeine        Social History     Social History     Marital status:      Spouse " "name: N/A     Number of children: N/A     Years of education: N/A     Social History Main Topics     Smoking status: Never Smoker     Smokeless tobacco: Never Used     Alcohol use Yes     Drug use: No     Sexual activity: Yes     Partners: Male     Birth control/ protection: None     Other Topics Concern     None     Social History Narrative       Family History   Problem Relation Age of Onset     Eye Disorder Father      macular degeneration         ROS: 10 point ROS neg other than the symptoms noted above in the HPI.    Vital Signs: /71 (BP Location: Right arm, Patient Position: Chair, Cuff Size: Adult Regular)  Pulse 88  Temp 98.2  F (36.8  C)  Ht 5' 5\" (1.651 m)  SpO2 98%    Examination:  Constitutional:  Alert, well nourished, NAD.  HEENT: Normocephalic, atraumatic.   Pulm:  Without shortness of breath   CV:  No pitting edema of BLE.    Neurological:  Awake  Alert  Oriented x 3  Speech clear  Cranial nerves II - XII intact    Motor exam   Shoulder Abduction:  Right:  5/5   Left:  5/5  Biceps:                      Right:  5/5   Left:  5/5  Triceps:                     Right:  5/5   Left:  5/5  Wrist Extensors:       Right:  5/5   Left:  5/5  Wrist Flexors:           Right:  5/5   Left:  5/5  Intrinsics:                   Right:  5/5   Left:  5/5  Hip Flexor:                Right: 5/5  Left:  5/5  Hip Adductor:             Right:  5/5  Left:  5/5  Hip Abductor:             Right:  5/5  Left:  5/5  Gastroc Soleus:        Right:  5/5  Left:  5/5  Tib/Ant:                      Right:  5/5  Left:  5/5  EHL:                          Right:  5/5  Left:  5/5   Sensation normal to bilateral upper and lower extremities  DTRs 1+ symmetric  Gait: Able to stand from a seated position. Normal non-antalgic, non-myelopathic gait.  Able to heel/toe walk without loss of balance  Cervical examination reveals good range of motion.  No tenderness to palpation of the cervical spine or paraspinous muscles " "bilaterally.    Lumbar examination reveals no tenderness of the spine or paraspinous muscles.  Hip height is symmetrical. Negative SI joint, sciatic notch or greater trochanteric tenderness to palpation bilaterally.  Straight leg raise is negative bilaterally.  FROM with discomfort with lateral bending.     Imaging: None    Assessment/Plan:  Low Back Pain    Taisha Yanez is a 57 year old female with increased low back pain that started approximately 2 months ago with no precipitating cause . She states, \"I've had back issues all of my life, but nothing like this.\"   She describes her pain as a constant dull pain to the left lower back which radiates into the left buttock.  She denies leg pain.  She denies BLE weakness or foot drop.  She states her pain generally worsens with prolonged standing and walking, \"And when I twist a certain way.\"  She finds some relief with sitting, \"But not too long.\"  She has not had injections or PT.  We discussed ordering lumbar Xrays considering her pain is localized to the left low back.  We also discussed PT, however, she would like to wait to see results of Xray.  If the pain increases and/or radicular symptoms she will contact us sooner.    Patient Instructions   Schedule lumbar Xray  We will call you with results.  Please contact the clinic if pain persists at [phone].          Armida Lezama [facility]  [facility]  [address] [address]    Tel [phone]  Pager [phone]      Again, thank you for allowing me to participate in the care of your patient.        Sincerely,        Armida Lezama, NP    "

## 2018-01-31 NOTE — MR AVS SNAPSHOT
After Visit Summary   1/31/2018    Taisha Yanez    MRN: [id number]           Patient Information     Date Of Birth          1960        Visit Information        Provider Department      1/31/2018 1:10 PM Armida Lezama NP [facility]        Today's Diagnoses     Chronic left-sided low back pain without sciatica    -  1      Care Instructions    Schedule lumbar Xray  We will call you with results.  Please contact the clinic if pain persists at [phone].            Follow-ups after your visit        Future tests that were ordered for you today     Open Future Orders        Priority Expected Expires Ordered    XR Lumbar Spine 2/3 Views Routine 1/31/2018 1/31/2019 1/31/2018            Who to contact     If you have questions or need follow up information about today's clinic visit or your schedule please contact [facility] directly at [phone].  Normal or non-critical lab and imaging results will be communicated to you by [employer], letter or phone within 4 business days after the clinic has received the results. If you do not hear from us within 7 days, please contact the clinic through [employer] or phone. If you have a critical or abnormal lab result, we will notify you by phone as soon as possible.  Submit refill requests through [employer] or call your pharmacy and they will forward the refill request to us. Please allow 3 business days for your refill to be completed.          Additional Information About Your Visit        MyChart Information     [employer] gives you secure access to your electronic health record. If you see a primary care provider, you can also send messages to your care team and make appointments. If you have questions, please call your primary care clinic.  If you do not have a primary care provider, please call [phone] and they will assist you.        Care EveryWhere ID     This is your Care EveryWhere ID.  "This could be used by other organizations to access your [facility] medical records  [id number]        Your Vitals Were     Pulse Temperature Height Pulse Oximetry          88 98.2  F (36.8  C) 5' 5\" (1.651 m) 98%         Blood Pressure from Last 3 Encounters:   01/31/18 118/71   10/13/17 112/77   07/05/17 111/73    Weight from Last 3 Encounters:   07/05/17 150 lb (68 kg)               Primary Care Provider Office Phone # Fax #    Cathryn Hills PA-C [phone] [address] [place] [address]        Equal Access to Services     [facility]: Hadii aad ku hadasho Soomaali, waaxda luqadaha, qaybta kaalmada adeegyada, waxaurora idiin hayadan vu . So [facility] [phone].    ATENCIÓN: Si habla español, tiene a saini disposición servicios gratuitos de asistencia lingüística. Llame al [phone].    We comply with applicable federal civil rights laws and [place] laws. We do not discriminate on the basis of race, color, national origin, age, disability, sex, sexual orientation, or gender identity.            Thank you!     Thank you for choosing [facility]  for your care. Our goal is always to provide you with excellent care. Hearing back from our patients is one way we can continue to improve our services. Please take a few minutes to complete the written survey that you may receive in the mail after your visit with us. Thank you!             Your Updated Medication List - Protect others around you: Learn how to safely use, store and throw away your medicines at www.disposemymeds.org.          This list is accurate as of 1/31/18  1:21 PM.  Always use your most recent med list.                   Brand Name Dispense Instructions for use Diagnosis    buPROPion 150 MG 24 hr tablet    WELLBUTRIN XL    90 tablet    Take 1 tablet (150 mg) by mouth every morning    Moderate major depression (H)       cyclobenzaprine 10 MG tablet    FLEXERIL    42 tablet    " Take 1 tablet (10 mg) by mouth 2 times daily as needed for muscle spasms    TMJ (temporomandibular joint syndrome)       dicyclomine 20 MG tablet    BENTYL    90 tablet    TAKE ONE TABLET BY MOUTH THREE TIMES A DAY WITH MEALS AS NEEDED. MAY ALSO TAKE BEFORE BED    Abdominal spasms       FLUoxetine 40 MG capsule    PROzac    90 capsule    Take 1 capsule (40 mg) by mouth daily    Moderate major depression (H)       fluticasone 50 MCG/ACT spray    FLONASE     2 sprays    Encounter for screening mammogram for breast cancer       ibuprofen 200 MG tablet    ADVIL/MOTRIN     Take 200-400 mg by mouth    Encounter for screening mammogram for breast cancer       pseudoePHEDrine 30 MG tablet    SUDAFED      Encounter for screening mammogram for breast cancer       RANITIDINE HCL PO       Abdominal spasms

## 2018-01-31 NOTE — NURSING NOTE
"Taisha Yanez is a 57 year old female who presents for:  Chief Complaint   Patient presents with     Neurologic Problem     Intermittent left sided LBP        Initial Vitals:  /71 (BP Location: Right arm, Patient Position: Chair, Cuff Size: Adult Regular)  Pulse 88  Temp 98.2  F (36.8  C)  Ht 5' 5\" (1.651 m)  SpO2 98% Estimated body mass index is 24.96 kg/(m^2) as calculated from the following:    Height as of 7/5/17: 5' 5\" (1.651 m).    Weight as of 7/5/17: 150 lb (68 kg).. There is no height or weight on file to calculate BSA. BP completed using cuff size: large  No Pain (0)    Do you feel safe in your environment?  Yes  Do you need any refills today? No    Nursing Comments: Intermittent left sided LBP.  Patient rates low back pain today as 0.      5 min. nursing intake time  Arelis Nielson CMA      Discharge plan: See provider's dictation.  2 min. nursing discharge time  Arelis Nielson CMA    "

## 2018-01-31 NOTE — PROGRESS NOTES
"Dr. Francis So  [facility]  Neurosurgery Clinic Visit      CC: Low back pain    Primary care Provider: Cathryn Hills      Reason For Visit:   The patient presents for evaluation of low back pain.      HPI: Taisha Yanez is a 57 year old female with increased low back pain that started approximately 2 months ago with no precipitating cause . She states, \"I've had back issues all of my life, but nothing like this.\"   She describes her pain as a constant dull pain to the left lower back which radiates into the left buttock.  She denies leg pain.  She denies BLE weakness or foot drop.  She states her pain generally worsens with prolonged standing and walking, \"And when I twist a certain way.\"  She finds some relief with sitting, \"But not too long.\"  She has not had injections or PT.       Pain right now:  2    History reviewed. No pertinent past medical history.    Past Medical History reviewed with patient during visit.    Past Surgical History:   Procedure Laterality Date     HYSTERECTOMY, PAP NO LONGER INDICATED       Past Surgical History reviewed with patient during visit.    Current Outpatient Prescriptions   Medication     RANITIDINE HCL PO     FLUoxetine (PROZAC) 40 MG capsule     dicyclomine (BENTYL) 20 MG tablet     buPROPion (WELLBUTRIN XL) 150 MG 24 hr tablet     cyclobenzaprine (FLEXERIL) 10 MG tablet     pseudoePHEDrine (SUDAFED) 30 MG tablet     fluticasone (FLONASE) 50 MCG/ACT spray     ibuprofen (ADVIL/MOTRIN) 200 MG tablet     No current facility-administered medications for this visit.        Allergies   Allergen Reactions     Codeine        Social History     Social History     Marital status:      Spouse name: N/A     Number of children: N/A     Years of education: N/A     Social History Main Topics     Smoking status: Never Smoker     Smokeless tobacco: Never Used     Alcohol use Yes     Drug use: No     Sexual activity: Yes     Partners: Male     Birth " "control/ protection: None     Other Topics Concern     None     Social History Narrative       Family History   Problem Relation Age of Onset     Eye Disorder Father      macular degeneration         ROS: 10 point ROS neg other than the symptoms noted above in the HPI.    Vital Signs: /71 (BP Location: Right arm, Patient Position: Chair, Cuff Size: Adult Regular)  Pulse 88  Temp 98.2  F (36.8  C)  Ht 5' 5\" (1.651 m)  SpO2 98%    Examination:  Constitutional:  Alert, well nourished, NAD.  HEENT: Normocephalic, atraumatic.   Pulm:  Without shortness of breath   CV:  No pitting edema of BLE.    Neurological:  Awake  Alert  Oriented x 3  Speech clear  Cranial nerves II - XII intact    Motor exam   Shoulder Abduction:  Right:  5/5   Left:  5/5  Biceps:                      Right:  5/5   Left:  5/5  Triceps:                     Right:  5/5   Left:  5/5  Wrist Extensors:       Right:  5/5   Left:  5/5  Wrist Flexors:           Right:  5/5   Left:  5/5  Intrinsics:                   Right:  5/5   Left:  5/5  Hip Flexor:                Right: 5/5  Left:  5/5  Hip Adductor:             Right:  5/5  Left:  5/5  Hip Abductor:             Right:  5/5  Left:  5/5  Gastroc Soleus:        Right:  5/5  Left:  5/5  Tib/Ant:                      Right:  5/5  Left:  5/5  EHL:                          Right:  5/5  Left:  5/5   Sensation normal to bilateral upper and lower extremities  DTRs 1+ symmetric  Gait: Able to stand from a seated position. Normal non-antalgic, non-myelopathic gait.  Able to heel/toe walk without loss of balance  Cervical examination reveals good range of motion.  No tenderness to palpation of the cervical spine or paraspinous muscles bilaterally.    Lumbar examination reveals no tenderness of the spine or paraspinous muscles.  Hip height is symmetrical. Negative SI joint, sciatic notch or greater trochanteric tenderness to palpation bilaterally.  Straight leg raise is negative bilaterally.  FROM with " "discomfort with lateral bending.     Imaging: None    Assessment/Plan:  Low Back Pain    Taisha Yanez is a 57 year old female with increased low back pain that started approximately 2 months ago with no precipitating cause . She states, \"I've had back issues all of my life, but nothing like this.\"   She describes her pain as a constant dull pain to the left lower back which radiates into the left buttock.  She denies leg pain.  She denies BLE weakness or foot drop.  She states her pain generally worsens with prolonged standing and walking, \"And when I twist a certain way.\"  She finds some relief with sitting, \"But not too long.\"  She has not had injections or PT.  We discussed ordering lumbar Xrays considering her pain is localized to the left low back.  We also discussed PT, however, she would like to wait to see results of Xray.  If the pain increases and/or radicular symptoms she will contact us sooner.    Patient Instructions   Schedule lumbar Xray  We will call you with results.  Please contact the clinic if pain persists at [phone].          Armida Lezama [facility]  [facility]  [address] [address]    Tel [phone]  Pager [phone]    "

## 2018-01-31 NOTE — PATIENT INSTRUCTIONS
Schedule lumbar Xray  We will call you with results.  Please contact the clinic if pain persists at [phone].

## 2018-02-06 ENCOUNTER — TELEPHONE (OUTPATIENT)
Dept: NEUROSURGERY | Facility: CLINIC | Age: 58
End: 2018-02-06

## 2018-02-06 NOTE — TELEPHONE ENCOUNTER
REASON FOR CALL:  Pt would like imaging results from last week.     Detailed message can be left:  YES

## 2018-02-07 DIAGNOSIS — M51.369 LUMBAR DEGENERATIVE DISC DISEASE: Primary | ICD-10-CM

## 2018-02-07 NOTE — TELEPHONE ENCOUNTER
Returned call to patient with results of Xrays.  Patient's pain predominantly low back and will refer to PT.  If no changes, pain increases or lumbar radicular symptoms pt will RTC.

## 2018-02-14 ENCOUNTER — THERAPY VISIT (OUTPATIENT)
Dept: PHYSICAL THERAPY | Facility: CLINIC | Age: 58
End: 2018-02-14
Payer: COMMERCIAL

## 2018-02-14 DIAGNOSIS — M54.50 LUMBAGO: Primary | ICD-10-CM

## 2018-02-14 PROCEDURE — G8982 BODY POS GOAL STATUS: HCPCS | Mod: GP | Performed by: PHYSICAL THERAPIST

## 2018-02-14 PROCEDURE — 97161 PT EVAL LOW COMPLEX 20 MIN: CPT | Mod: GP | Performed by: PHYSICAL THERAPIST

## 2018-02-14 PROCEDURE — 97110 THERAPEUTIC EXERCISES: CPT | Mod: GP | Performed by: PHYSICAL THERAPIST

## 2018-02-14 PROCEDURE — G8981 BODY POS CURRENT STATUS: HCPCS | Mod: GP | Performed by: PHYSICAL THERAPIST

## 2018-02-14 NOTE — LETTER
[facility] PHYSICAL THERAPY  [address] [address]  [phone]    February 15, 2018    Re: Taisha Yanez   :   1960  MRN:  [id number]   REFERRING PHYSICIAN:   Armida Lezama    [facility] PHYSICAL THERAPY    Date of Initial Evaluation:  2018  Visits:  Rxs Used: 1  Reason for Referral:  Lumbago    EVALUATION SUMMARY    [facility] Initial Evaluation    Subjective:  Patient is a 57 year old female presenting with rehab back hpi.   Taisha Yanez is a 57 year old female with a lumbar condition.  Condition occurred with:  Degenerative joint disease.  Condition occurred: at home.  This is a new condition  Taisha Powell reports today with complaints of LBP which she most recently saw a MD for on 18 and was diagnosed with DDD. She reports that this has been an on and off thing for her whole life and states that it has recently got worse. She reports that rolling over in bed is difficult. She reports that she has a sharp with rotating and then it does bother her a bit more after. She reports that sitting is ok. Standing or walking for long distance is difficult at times. She reports that activity does help during the day. She reprots that it wakes her often in the middle of the night. .    Patient reports pain:  Upper lumbar spine, mid lumbar spine, lower lumbar spine, SI joint left and lumbar spine right.  Radiates to:  No radiation.  Pain is described as stabbing, sharp and aching and is intermittent and reported as 8/10 and 4/10.  Associated symptoms:  Loss of strength, loss of motion/stiffness and loss of motion. Pain is the same all the time.  Symptoms are exacerbated by lifting, sitting, standing, carrying, walking, twisting, bending and lying down and relieved by rest and activity/movement.  Since onset symptoms are unchanged.  Special tests:  X-ray (DDD).  Previous treatment: none.  Improvement with previous treatment: none.  General health as  reported by patient is good.  Pertinent medical history includes:  Depression and menopausal.  Medical allergies: yes.  Other surgeries include:  No.  Current medications:  Anti-depressants.  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  Primary job tasks include:  Prolonged sitting, repetitive tasks and prolonged standing.  Barriers include:  None as reported by the patient.  Red flags:  None as reported by the patient.            Objective:  LUMBAR:  Posture: forward flexed posture, SI alignment wnl upon eval today  Neurological:  Motor Deficit:  Myotomes L R   L1-2 (hip flexion) wnl wnl   L3 (knee extension) wnl wnl   L4 (ankle DF) wnl wnl   L5 (g. toe ext) wnl wnl   S1 (ankle PF or knee flex)     Sensory Deficit, Reflexes: wnl  Dural Signs:   L R   Slump - -   SLR - -   Other: no radiating pain  AROM: (Major, Moderate, Minimal or Nil loss)  Movement Loss Rafa Mod Min Nil Pain   Flexion    x    Extension   x  Favors going to R away from pinch pain   Side Gliding L x    Sharp pain on L   Side Gliding R  x        Repeated movement testing:   (During: produces, abolishes, increases, decreases, no effect, centralizing, peripheralizing; After: better, worse, no better, no worse, no effect, centralized, peripheralized)  Repeated extension in standing and with pressups had no effect on symptoms. Did have slight improvement in pain however symptom of a pinching pain in L PSIS area remained the same.  Suspect patients symptoms due to hypomobile PSIS and lumbar facet joints primarily on L. Did have decrease in pain and improved mobility with PA glides to sacrum and lumbar spine. Progress mobility and core stab    Assessment/Plan:    Patient is a 57 year old female with lumbar complaints.    Patient has the following significant findings with corresponding treatment plan.                Diagnosis 1:  LBP  Pain -  hot/cold therapy, US, manual therapy, self management, education,  directional preference exercise and home program  Decreased ROM/flexibility - manual therapy, therapeutic exercise, therapeutic activity and home program  Decreased joint mobility - manual therapy, therapeutic exercise, therapeutic activity and home program  Decreased strength - therapeutic exercise, therapeutic activities and home program  Impaired muscle performance - neuro re-education and home program  Decreased function - therapeutic activities and home program  Impaired posture - neuro re-education, therapeutic activities and home program                      Re: Taisha Yanez   :   1960    Therapy Evaluation Codes:   1) History comprised of:   Personal factors that impact the plan of care:      Time since onset of symptoms.    Comorbidity factors that impact the plan of care are:      Depression and Menopausal.     Medications impacting care: Anti-depressant and Anti-inflammatory.  2) Examination of Body Systems comprised of:   Body structures and functions that impact the plan of care:      Lumbar spine.   Activity limitations that impact the plan of care are:      Driving, Lifting, Reading/Computer work, Sitting, Squatting/kneeling, Stairs, Standing, Walking, Working, Sleeping and Laying down.  3) Clinical presentation characteristics are:   Stable/Uncomplicated.  4) Decision-Making    Low complexity using standardized patient assessment instrument and/or measureable assessment of functional outcome.  Cumulative Therapy Evaluation is: Low complexity.  Previous and current functional limitations:  (See Goal Flow Sheet for this information)    Short term and Long term goals: (See Goal Flow Sheet for this information)   Communication ability:  Patient appears to be able to clearly communicate and understand verbal and written communication and follow directions correctly.  Treatment Explanation - The following has been discussed with the patient:   RX ordered/plan of care  Anticipated  outcomes  Possible risks and side effects  This patient would benefit from PT intervention to resume normal activities.   Rehab potential is good.  Frequency:  1 X week, once daily  Duration:  for 8 weeks  Discharge Plan:  Achieve all LTG.  Independent in home treatment program.  Reach maximal therapeutic benefit.      Thank you for your referral.    INQUIRIES  Therapist: KIMBER Schmidt [facility] PHYSICAL THERAPY  [address] [address]  Phone: [phone]  Fax: [phone]

## 2018-02-14 NOTE — PROGRESS NOTES
[facility] Initial Evaluation  Subjective:  Patient is a 57 year old female presenting with rehab back hpi.   Taisha Yanez is a 57 year old female with a lumbar condition.  Condition occurred with:  Degenerative joint disease.  Condition occurred: at home.  This is a new condition  Taisha Powell reports today with complaints of LBP which she most recently saw a MD for on 2/7/18 and was diagnosed with DDD. She reports that this has been an on and off thing for her whole life and states that it has recently got worse. She reports that rolling over in bed is difficult. She reports that she has a sharp with rotating and then it does bother her a bit more after. She reports that sitting is ok. Standing or walking for long distance is difficult at times. She reports that activity does help during the day. She reprots that it wakes her often in the middle of the night. .    Patient reports pain:  Upper lumbar spine, mid lumbar spine, lower lumbar spine, SI joint left and lumbar spine right.  Radiates to:  No radiation.  Pain is described as stabbing, sharp and aching and is intermittent and reported as 8/10 and 4/10.  Associated symptoms:  Loss of strength, loss of motion/stiffness and loss of motion. Pain is the same all the time.  Symptoms are exacerbated by lifting, sitting, standing, carrying, walking, twisting, bending and lying down and relieved by rest and activity/movement.  Since onset symptoms are unchanged.  Special tests:  X-ray (DDD).  Previous treatment: none.  Improvement with previous treatment: none.  General health as reported by patient is good.  Pertinent medical history includes:  Depression and menopausal.  Medical allergies: yes.  Other surgeries include:  No.  Current medications:  Anti-depressants.  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  Primary job tasks include:  Prolonged sitting, repetitive tasks and prolonged  standing.    Barriers include:  None as reported by the patient.    Red flags:  None as reported by the patient.                        Objective:LUMBAR:    Posture: forward flexed posture, SI alignment wnl upon eval today    Neurological:    Motor Deficit:  Myotomes L R   L1-2 (hip flexion) wnl wnl   L3 (knee extension) wnl wnl   L4 (ankle DF) wnl wnl   L5 (g. toe ext) wnl wnl   S1 (ankle PF or knee flex)       Sensory Deficit, Reflexes: wnl    Dural Signs:   L R   Slump - -   SLR - -   Other: no radiating pain    AROM: (Major, Moderate, Minimal or Nil loss)  Movement Loss Rafa Mod Min Nil Pain   Flexion    x    Extension   x  Favors going to R away from pinch pain   Side Gliding L x    Sharp pain on L   Side Gliding R  x        Repeated movement testing:   (During: produces, abolishes, increases, decreases, no effect, centralizing, peripheralizing; After: better, worse, no better, no worse, no effect, centralized, peripheralized)    Repeated extension in standing and with pressups had no effect on symptoms. Did have slight improvement in pain however symptom of a pinching pain in L PSIS area remained the same.    Suspect patients symptoms due to hypomobile PSIS and lumbar facet joints primarily on L. Did have decrease in pain and improved mobility with PA glides to sacrum and lumbar spine. Progress mobility and core stab      System    Physical Exam    General     ROS    Assessment/Plan:    Patient is a 57 year old female with lumbar complaints.    Patient has the following significant findings with corresponding treatment plan.                Diagnosis 1:  LBP  Pain -  hot/cold therapy, US, manual therapy, self management, education, directional preference exercise and home program  Decreased ROM/flexibility - manual therapy, therapeutic exercise, therapeutic activity and home program  Decreased joint mobility - manual therapy, therapeutic exercise, therapeutic activity and home program  Decreased strength -  therapeutic exercise, therapeutic activities and home program  Impaired muscle performance - neuro re-education and home program  Decreased function - therapeutic activities and home program  Impaired posture - neuro re-education, therapeutic activities and home program    Therapy Evaluation Codes:   1) History comprised of:   Personal factors that impact the plan of care:      Time since onset of symptoms.    Comorbidity factors that impact the plan of care are:      Depression and Menopausal.     Medications impacting care: Anti-depressant and Anti-inflammatory.  2) Examination of Body Systems comprised of:   Body structures and functions that impact the plan of care:      Lumbar spine.   Activity limitations that impact the plan of care are:      Driving, Lifting, Reading/Computer work, Sitting, Squatting/kneeling, Stairs, Standing, Walking, Working, Sleeping and Laying down.  3) Clinical presentation characteristics are:   Stable/Uncomplicated.  4) Decision-Making    Low complexity using standardized patient assessment instrument and/or measureable assessment of functional outcome.  Cumulative Therapy Evaluation is: Low complexity.    Previous and current functional limitations:  (See Goal Flow Sheet for this information)    Short term and Long term goals: (See Goal Flow Sheet for this information)     Communication ability:  Patient appears to be able to clearly communicate and understand verbal and written communication and follow directions correctly.  Treatment Explanation - The following has been discussed with the patient:   RX ordered/plan of care  Anticipated outcomes  Possible risks and side effects  This patient would benefit from PT intervention to resume normal activities.   Rehab potential is good.    Frequency:  1 X week, once daily  Duration:  for 8 weeks  Discharge Plan:  Achieve all LTG.  Independent in home treatment program.  Reach maximal therapeutic benefit.    Please refer to the daily  flowsheet for treatment today, total treatment time and time spent performing 1:1 timed codes.

## 2018-03-14 ENCOUNTER — THERAPY VISIT (OUTPATIENT)
Dept: PHYSICAL THERAPY | Facility: CLINIC | Age: 58
End: 2018-03-14
Payer: COMMERCIAL

## 2018-03-14 DIAGNOSIS — M54.50 LUMBAGO: ICD-10-CM

## 2018-03-14 PROCEDURE — 97110 THERAPEUTIC EXERCISES: CPT | Mod: GP | Performed by: PHYSICAL THERAPIST

## 2018-03-14 PROCEDURE — 97140 MANUAL THERAPY 1/> REGIONS: CPT | Mod: GP | Performed by: PHYSICAL THERAPIST

## 2018-03-14 PROCEDURE — 97112 NEUROMUSCULAR REEDUCATION: CPT | Mod: GP | Performed by: PHYSICAL THERAPIST

## 2018-03-28 ENCOUNTER — THERAPY VISIT (OUTPATIENT)
Dept: PHYSICAL THERAPY | Facility: CLINIC | Age: 58
End: 2018-03-28
Payer: COMMERCIAL

## 2018-03-28 DIAGNOSIS — M54.50 LUMBAGO: ICD-10-CM

## 2018-03-28 PROCEDURE — 97110 THERAPEUTIC EXERCISES: CPT | Mod: GP | Performed by: PHYSICAL THERAPIST

## 2018-03-28 PROCEDURE — 97112 NEUROMUSCULAR REEDUCATION: CPT | Mod: GP | Performed by: PHYSICAL THERAPIST

## 2018-03-28 PROCEDURE — 97140 MANUAL THERAPY 1/> REGIONS: CPT | Mod: GP | Performed by: PHYSICAL THERAPIST

## 2018-04-16 DIAGNOSIS — F32.1 MODERATE MAJOR DEPRESSION (H): ICD-10-CM

## 2018-04-16 RX ORDER — BUPROPION HYDROCHLORIDE 150 MG/1
TABLET ORAL
Qty: 90 TABLET | Refills: 0 | Status: SHIPPED | OUTPATIENT
Start: 2018-04-16 | End: 2018-07-19

## 2018-04-16 RX ORDER — FLUOXETINE 40 MG/1
CAPSULE ORAL
Qty: 90 CAPSULE | Refills: 0 | Status: SHIPPED | OUTPATIENT
Start: 2018-04-16 | End: 2018-07-19

## 2018-04-16 NOTE — TELEPHONE ENCOUNTER
"Requested Prescriptions   Pending Prescriptions Disp Refills     buPROPion (WELLBUTRIN XL) 150 MG 24 hr tablet [Pharmacy Med Name: BuPROPion HCl ER (XL) Oral Tablet Extended Release 24 Hour 150 MG] 90 tablet 0    Last Written Prescription Date:  10/13/2017 #90 x 1  Last filled 01/17/2018  Last office visit: 10/13/2017 [facility]   Future Office Visit:  None   Sig: Take 1 tablet (150 mg) by mouth every morning    SSRIs Protocol Failed    4/16/2018 10:19 AM       Failed - PHQ-9 score less than 5 in past 6 months    Please review last PHQ-9 score.   PHQ-9 SCORE 7/5/2017   Total Score 2       DREW-7 SCORE 7/5/2017   Total Score 0                Failed - Recent (6 mo) or future (30 days) visit within the authorizing provider's specialty    Patient had office visit in the last 6 months or has a visit in the next 30 days with authorizing provider or within the authorizing provider's specialty.  See \"Patient Info\" tab in inbasket, or \"Choose Columns\" in Meds & Orders section of the refill encounter.           Passed - Medication is Bupropion    If the medication is Bupropion (Wellbutrin), and the patient is taking for smoking cessation; OK to refill.         Passed - Patient is age 18 or older       Passed - No active pregnancy on record       Passed - No positive pregnancy test in last 12 months        FLUoxetine (PROZAC) 40 MG capsule [Pharmacy Med Name: FLUoxetine HCl Oral Capsule 40 MG] 90 capsule 0    Last Written Prescription Date:  10/13/2017 #90 x 1  Last filled 01/17/2018  Last office visit: 10/13/2017 [facility]   Future Office Visit:  None   Sig: Take 1 capsule (40 mg) by mouth daily    SSRIs Protocol Failed    4/16/2018 10:19 AM       Failed - PHQ-9 score less than 5 in past 6 months    Please review last PHQ-9 score.   PHQ-9 SCORE 7/5/2017   Total Score 2       DREW-7 SCORE 7/5/2017   Total Score 0                Failed - Recent (6 mo) or future (30 days) visit within the authorizing provider's specialty    " "Patient had office visit in the last 6 months or has a visit in the next 30 days with authorizing provider or within the authorizing provider's specialty.  See \"Patient Info\" tab in inbasket, or \"Choose Columns\" in Meds & Orders section of the refill encounter.           Passed - Medication is Bupropion    If the medication is Bupropion (Wellbutrin), and the patient is taking for smoking cessation; OK to refill.         Passed - Patient is age 18 or older       Passed - No active pregnancy on record       Passed - No positive pregnancy test in last 12 months          "

## 2018-06-27 ENCOUNTER — TELEPHONE (OUTPATIENT)
Dept: FAMILY MEDICINE | Facility: CLINIC | Age: 58
End: 2018-06-27

## 2018-06-28 NOTE — TELEPHONE ENCOUNTER
Please call Taisha, I received a message for a skin tag referral (a few weeks ago, sorry it came into a basket I don't typically check).  She can schedule an office visit to have these removed (by me or family practice provider).   Cathryn Hills PA-C

## 2018-07-19 DIAGNOSIS — F32.1 MODERATE MAJOR DEPRESSION (H): ICD-10-CM

## 2018-07-19 RX ORDER — FLUOXETINE 40 MG/1
CAPSULE ORAL
Qty: 90 CAPSULE | Refills: 0 | Status: SHIPPED | OUTPATIENT
Start: 2018-07-19 | End: 2018-10-24

## 2018-07-19 RX ORDER — BUPROPION HYDROCHLORIDE 150 MG/1
TABLET ORAL
Qty: 90 TABLET | Refills: 0 | Status: SHIPPED | OUTPATIENT
Start: 2018-07-19 | End: 2018-10-24

## 2018-07-19 NOTE — TELEPHONE ENCOUNTER
"Requested Prescriptions   Pending Prescriptions Disp Refills     FLUoxetine (PROZAC) 40 MG capsule [Pharmacy Med Name: FLUoxetine HCl Oral Capsule 40 MG] 90 capsule 0    Last Written Prescription Date:  04/16/2018 #90 x 0  Last filled 04/16/2018  Last office visit: 10/13/2017 [facility]   Future Office Visit: None   Sig: Take 1 capsule (40 mg) by mouth daily    SSRIs Protocol Failed    7/19/2018  7:00 AM       Failed - PHQ-9 score less than 5 in past 6 months    Please review last PHQ-9 score.   PHQ-9 SCORE 7/5/2017   Total Score 2       DREW-7 SCORE 7/5/2017   Total Score 0                Failed - Recent (6 mo) or future (30 days) visit within the authorizing provider's specialty    Patient had office visit in the last 6 months or has a visit in the next 30 days with authorizing provider or within the authorizing provider's specialty.  See \"Patient Info\" tab in inbasket, or \"Choose Columns\" in Meds & Orders section of the refill encounter.           Passed - Medication is Bupropion    If the medication is Bupropion (Wellbutrin), and the patient is taking for smoking cessation; OK to refill.         Passed - Patient is age 18 or older       Passed - No active pregnancy on record       Passed - No positive pregnancy test in last 12 months        buPROPion (WELLBUTRIN XL) 150 MG 24 hr tablet [Pharmacy Med Name: BuPROPion HCl ER (XL) Oral Tablet Extended Release 24 Hour 150 MG] 90 tablet 0    Last Written Prescription Date:  04/16/2018 #90 x 0  Last filled 04/16/2018  Last office visit: 10/13/2017 [facility]   Future Office Visit: None   Sig: Take 1 tablet (150 mg) by mouth every morning    SSRIs Protocol Failed    7/19/2018  7:00 AM       Failed - PHQ-9 score less than 5 in past 6 months    Please review last PHQ-9 score.   PHQ-9 SCORE 7/5/2017   Total Score 2       DREW-7 SCORE 7/5/2017   Total Score 0                Failed - Recent (6 mo) or future (30 days) visit within the authorizing provider's specialty    Patient " "had office visit in the last 6 months or has a visit in the next 30 days with authorizing provider or within the authorizing provider's specialty.  See \"Patient Info\" tab in inbasket, or \"Choose Columns\" in Meds & Orders section of the refill encounter.           Passed - Medication is Bupropion    If the medication is Bupropion (Wellbutrin), and the patient is taking for smoking cessation; OK to refill.         Passed - Patient is age 18 or older       Passed - No active pregnancy on record       Passed - No positive pregnancy test in last 12 months          "

## 2018-10-24 DIAGNOSIS — F32.1 MODERATE MAJOR DEPRESSION (H): ICD-10-CM

## 2018-10-24 RX ORDER — BUPROPION HYDROCHLORIDE 150 MG/1
150 TABLET ORAL EVERY MORNING
Qty: 30 TABLET | Refills: 0 | Status: SHIPPED | OUTPATIENT
Start: 2018-10-24 | End: 2018-11-23

## 2018-10-24 RX ORDER — FLUOXETINE 40 MG/1
40 CAPSULE ORAL DAILY
Qty: 30 CAPSULE | Refills: 0 | Status: SHIPPED | OUTPATIENT
Start: 2018-10-24 | End: 2018-11-23

## 2018-10-24 NOTE — TELEPHONE ENCOUNTER
"Requested Prescriptions   Pending Prescriptions Disp Refills     FLUoxetine (PROZAC) 40 MG capsule [Pharmacy Med Name: FLUoxetine HCl Oral Capsule 40 MG] 90 capsule 0    Last Written Prescription Date:  7/19/18  Last Fill Quantity: 90,  # refills: 0   Last office visit: 10/13/2017 with prescribing provider:  akhil   Future Office Visit:     Sig: Take 1 capsule (40 mg) by mouth daily    SSRIs Protocol Failed    10/24/2018  8:36 AM       Failed - PHQ-9 score less than 5 in past 6 months    Please review last PHQ-9 score.          Failed - Recent (6 mo) or future (30 days) visit within the authorizing provider's specialty    Patient had office visit in the last 6 months or has a visit in the next 30 days with authorizing provider or within the authorizing provider's specialty.  See \"Patient Info\" tab in inbasket, or \"Choose Columns\" in Meds & Orders section of the refill encounter.           Passed - Medication is Bupropion    If the medication is Bupropion (Wellbutrin), and the patient is taking for smoking cessation; OK to refill.         Passed - Patient is age 18 or older       Passed - No active pregnancy on record       Passed - No positive pregnancy test in last 12 months        buPROPion (WELLBUTRIN XL) 150 MG 24 hr tablet [Pharmacy Med Name: BuPROPion HCl ER (XL) Oral Tablet Extended Release 24 Hour 150 MG] 90 tablet 0    Last Written Prescription Date:  7/19/18  Last Fill Quantity: 90,  # refills: 0   Last office visit: 10/13/2017 with prescribing provider:  akhil   Future Office Visit:     Sig: Take 1 tablet (150 mg) by mouth every morning    SSRIs Protocol Failed    10/24/2018  8:36 AM       Failed - PHQ-9 score less than 5 in past 6 months    Please review last PHQ-9 score.          Failed - Recent (6 mo) or future (30 days) visit within the authorizing provider's specialty    Patient had office visit in the last 6 months or has a visit in the next 30 days with authorizing provider or within the " "authorizing provider's specialty.  See \"Patient Info\" tab in inbasket, or \"Choose Columns\" in Meds & Orders section of the refill encounter.           Passed - Medication is Bupropion    If the medication is Bupropion (Wellbutrin), and the patient is taking for smoking cessation; OK to refill.         Passed - Patient is age 18 or older       Passed - No active pregnancy on record       Passed - No positive pregnancy test in last 12 months          "

## 2018-11-23 ENCOUNTER — OFFICE VISIT (OUTPATIENT)
Dept: FAMILY MEDICINE | Facility: CLINIC | Age: 58
End: 2018-11-23
Payer: COMMERCIAL

## 2018-11-23 VITALS
DIASTOLIC BLOOD PRESSURE: 78 MMHG | SYSTOLIC BLOOD PRESSURE: 120 MMHG | HEART RATE: 72 BPM | RESPIRATION RATE: 18 BRPM | TEMPERATURE: 98.3 F

## 2018-11-23 DIAGNOSIS — F32.1 MODERATE MAJOR DEPRESSION (H): ICD-10-CM

## 2018-11-23 DIAGNOSIS — L72.0 MILIA: Primary | ICD-10-CM

## 2018-11-23 DIAGNOSIS — N95.2 ATROPHIC VAGINITIS: ICD-10-CM

## 2018-11-23 DIAGNOSIS — K21.00 GASTROESOPHAGEAL REFLUX DISEASE WITH ESOPHAGITIS: ICD-10-CM

## 2018-11-23 PROBLEM — K21.9 GERD (GASTROESOPHAGEAL REFLUX DISEASE): Status: ACTIVE | Noted: 2017-01-25

## 2018-11-23 PROCEDURE — 99213 OFFICE O/P EST LOW 20 MIN: CPT | Performed by: PHYSICIAN ASSISTANT

## 2018-11-23 RX ORDER — FLUOXETINE 40 MG/1
40 CAPSULE ORAL DAILY
Qty: 90 CAPSULE | Refills: 3 | Status: SHIPPED | OUTPATIENT
Start: 2018-11-23 | End: 2019-11-17

## 2018-11-23 RX ORDER — BUPROPION HYDROCHLORIDE 150 MG/1
150 TABLET ORAL EVERY MORNING
Qty: 90 TABLET | Refills: 3 | Status: SHIPPED | OUTPATIENT
Start: 2018-11-23 | End: 2019-11-17

## 2018-11-23 ASSESSMENT — ANXIETY QUESTIONNAIRES
3. WORRYING TOO MUCH ABOUT DIFFERENT THINGS: NOT AT ALL
GAD7 TOTAL SCORE: 0
5. BEING SO RESTLESS THAT IT IS HARD TO SIT STILL: NOT AT ALL
2. NOT BEING ABLE TO STOP OR CONTROL WORRYING: NOT AT ALL
1. FEELING NERVOUS, ANXIOUS, OR ON EDGE: NOT AT ALL
7. FEELING AFRAID AS IF SOMETHING AWFUL MIGHT HAPPEN: NOT AT ALL
6. BECOMING EASILY ANNOYED OR IRRITABLE: NOT AT ALL

## 2018-11-23 ASSESSMENT — PAIN SCALES - GENERAL: PAINLEVEL: NO PAIN (0)

## 2018-11-23 ASSESSMENT — PATIENT HEALTH QUESTIONNAIRE - PHQ9
5. POOR APPETITE OR OVEREATING: NOT AT ALL
SUM OF ALL RESPONSES TO PHQ QUESTIONS 1-9: 0

## 2018-11-23 NOTE — PROGRESS NOTES
SUBJECTIVE:   Taisha Yanez is a 57 year old female who presents to clinic today for the following health issues:      Concern - Skin Tag  Onset: Ongoing issue    Description:   Multiple skin tags on her face that she would like looked at. Denies any itching, pain or bleeding    Intensity: moderate    Progression of Symptoms:  worsening    Previous history of similar problem:   Yes, have had some removed in the past  Therapies Tried and outcome: None    Depression Followup    Status since last visit: Stable     See PHQ-9 for current symptoms.  Other associated symptoms: None    Complicating factors:   Significant life event:  No   Current substance abuse:  None  Anxiety or Panic symptoms:  No    PHQ 7/5/2017   PHQ-9 Total Score 2   Q9: Suicide Ideation Not at all       PHQ-9  English  PHQ-9   Any Language  Suicide Assessment Five-step Evaluation and Treatment (SAFE-T)    Amount of exercise or physical activity: Informal exercise a couple of days per week, no regular exercise    Problems taking medications regularly: No    Medication side effects: none    Diet: regular (no restrictions)    Ranitidine helps control her heartburn.       She c/o vaginal dryness, this causes discomfort during intercourse.  She also c/o decreased sex drive, she states she has never really had much of a sex drive to begin with.  Her  is more worried about it than she is, he wonders if her medications are contributing.     She wonders about vaccines due at her age.     Problem list and histories reviewed & adjusted, as indicated.  Additional history: as documented    Patient Active Problem List   Diagnosis     Cellulitis of foot, right     TMJ (temporomandibular joint syndrome)     Moderate major depression (H)     Anxiety     Chronic sinusitis, unspecified location     Lumbago     Atrophic vaginitis     Past Surgical History:   Procedure Laterality Date     HYSTERECTOMY, PAP NO LONGER INDICATED         Social History   Substance  Use Topics     Smoking status: Never Smoker     Smokeless tobacco: Never Used     Alcohol use Yes     Family History   Problem Relation Age of Onset     Eye Disorder Father      macular degeneration         Current Outpatient Prescriptions   Medication Sig Dispense Refill     buPROPion (WELLBUTRIN XL) 150 MG 24 hr tablet Take 1 tablet (150 mg) by mouth every morning 90 tablet 3     FLUoxetine (PROZAC) 40 MG capsule Take 1 capsule (40 mg) by mouth daily 90 capsule 3     fluticasone (FLONASE) 50 MCG/ACT spray 2 sprays       ibuprofen (ADVIL/MOTRIN) 200 MG tablet Take 200-400 mg by mouth       RANITIDINE HCL PO        cyclobenzaprine (FLEXERIL) 10 MG tablet Take 1 tablet (10 mg) by mouth 2 times daily as needed for muscle spasms (Patient not taking: Reported on 11/23/2018) 42 tablet 0     dicyclomine (BENTYL) 20 MG tablet TAKE ONE TABLET BY MOUTH THREE TIMES A DAY WITH MEALS AS NEEDED. MAY ALSO TAKE BEFORE BED (Patient not taking: Reported on 11/23/2018) 90 tablet 1     [DISCONTINUED] buPROPion (WELLBUTRIN XL) 150 MG 24 hr tablet Take 1 tablet (150 mg) by mouth every morning (Needs follow-up appointment for this medication) 30 tablet 0     [DISCONTINUED] FLUoxetine (PROZAC) 40 MG capsule Take 1 capsule (40 mg) by mouth daily (Needs follow-up appointment for this medication) 30 capsule 0     BP Readings from Last 3 Encounters:   11/23/18 120/78   01/31/18 118/71   10/13/17 112/77    Wt Readings from Last 3 Encounters:   07/05/17 150 lb (68 kg)                    Reviewed and updated as needed this visit by clinical staff       Reviewed and updated as needed this visit by Provider         ROS:  Constitutional, HEENT, cardiovascular, pulmonary, GI, , musculoskeletal, neuro, skin, endocrine and psych systems are negative, except as otherwise noted.    OBJECTIVE:     /78 (BP Location: Right arm, Patient Position: Chair, Cuff Size: Adult Regular)  Pulse 72  Temp 98.3  F (36.8  C) (Tympanic)  Resp 18  There is no  height or weight on file to calculate BMI.  GENERAL: healthy, alert and no distress  Skin:  Multiple small white (with mild erythema) papules noted around both eyes.    Diagnostic Test Results:  none     ASSESSMENT/PLAN:       1. Moderate major depression (H)  Will leave meds as is for now, however may consider increasing dose of wellbutrin and decreasing dose of prozac to see if this will help with sex drive.  She did not have much of a sex drive to begin with and her hormones are likely the major contributor to her decreasing sex drive at her age.  - buPROPion (WELLBUTRIN XL) 150 MG 24 hr tablet; Take 1 tablet (150 mg) by mouth every morning  Dispense: 90 tablet; Refill: 3  - FLUoxetine (PROZAC) 40 MG capsule; Take 1 capsule (40 mg) by mouth daily  Dispense: 90 capsule; Refill: 3    2. Milia  F/u with derm for removal if desired.   - DERMATOLOGY REFERRAL    3. Atrophic vaginitis  Suggest a daily vaginal lubricant (water based).     GERD:  Stable on ranitidine.     F/u with pharmacy for shingles vaccine.    FUTURE APPOINTMENTS:       - Follow-up visit if symptoms worsen or fail to improve as anticipated (may send a message if she wants to increase wellbutrin to 300 mg and decrease prozac to 20 mg).     Cathryn Hills PA-C  [facility]

## 2018-11-23 NOTE — NURSING NOTE
"Initial /78 (BP Location: Right arm, Patient Position: Chair, Cuff Size: Adult Regular)  Pulse 72  Temp 98.3  F (36.8  C) (Tympanic)  Resp 18 Estimated body mass index is 24.96 kg/(m^2) as calculated from the following:    Height as of 1/31/18: 5' 5\" (1.651 m).    Weight as of 7/5/17: 150 lb (68 kg). .    Carolynn Rasmussen CMA ([facility])    "

## 2018-11-23 NOTE — LETTER
My Depression Action Plan  Name: Taisha Yanez   Date of Birth 1960  Date: 11/23/2018    My doctor: Cathryn Hills   My clinic: [address] [address] [phone]          GREEN    ZONE   Good Control    What it looks like:     Things are going generally well. You have normal up s and down s. You may even feel depressed from time to time, but bad moods usually last less than a day.   What you need to do:  1. Continue to care for yourself (see self care plan)  2. Check your depression survival kit and update it as needed  3. Follow your physician s recommendations including any medication.  4. Do not stop taking medication unless you consult with your physician first.           YELLOW         ZONE Getting Worse    What it looks like:     Depression is starting to interfere with your life.     It may be hard to get out of bed; you may be starting to isolate yourself from others.    Symptoms of depression are starting to last most all day and this has happened for several days.     You may have suicidal thoughts but they are not constant.   What you need to do:     1. Call your care team, your response to treatment will improve if you keep your care team informed of your progress. Yellow periods are signs an adjustment may need to be made.     2. Continue your self-care, even if you have to fake it!    3. Talk to someone in your support network    4. Open up your depression survival kit           RED    ZONE Medical Alert - Get Help    What it looks like:     Depression is seriously interfering with your life.     You may experience these or other symptoms: You can t get out of bed most days, can t work or engage in other necessary activities, you have trouble taking care of basic hygiene, or basic responsibilities, thoughts of suicide or death that will not go away, self-injurious behavior.     What you need to do:  1. Call your care team  and request a same-day appointment. If they are not available (weekends or after hours) call your local crisis line, emergency room or 911.            Depression Self Care Plan / Survival Kit    Self-Care for Depression  Here s the deal. Your body and mind are really not as separate as most people think.  What you do and think affects how you feel and how you feel influences what you do and think. This means if you do things that people who feel good do, it will help you feel better.  Sometimes this is all it takes.  There is also a place for medication and therapy depending on how severe your depression is, so be sure to consult with your medical provider and/ or Behavioral Health Consultant if your symptoms are worsening or not improving.     In order to better manage my stress, I will:    Exercise  Get some form of exercise, every day. This will help reduce pain and release endorphins, the  feel good  chemicals in your brain. This is almost as good as taking antidepressants!  This is not the same as joining a gym and then never going! (they count on that by the way ) It can be as simple as just going for a walk or doing some gardening, anything that will get you moving.      Hygiene   Maintain good hygiene (Get out of bed in the morning, Make your bed, Brush your teeth, Take a shower, and Get dressed like you were going to work, even if you are unemployed).  If your clothes don't fit try to get ones that do.    Diet  I will strive to eat foods that are good for me, drink plenty of water, and avoid excessive sugar, caffeine, alcohol, and other mood-altering substances.  Some foods that are helpful in depression are: complex carbohydrates, B vitamins, flaxseed, fish or fish oil, fresh fruits and vegetables.    Psychotherapy  I agree to participate in Individual Therapy (if recommended).    Medication  If prescribed medications, I agree to take them.  Missing doses can result in serious side effects.  I understand  that drinking alcohol, or other illicit drug use, may cause potential side effects.  I will not stop my medication abruptly without first discussing it with my provider.    Staying Connected With Others  I will stay in touch with my friends, family members, and my primary care provider/team.    Use your imagination  Be creative.  We all have a creative side; it doesn t matter if it s oil painting, sand castles, or mud pies! This will also kick up the endorphins.    Witness Beauty  (AKA stop and smell the roses) Take a look outside, even in mid-winter. Notice colors, textures. Watch the squirrels and birds.     Service to others  Be of service to others.  There is always someone else in need.  By helping others we can  get out of ourselves  and remember the really important things.  This also provides opportunities for practicing all the other parts of the program.    Humor  Laugh and be silly!  Adjust your TV habits for less news and crime-drama and more comedy.    Control your stress  Try breathing deep, massage therapy, biofeedback, and meditation. Find time to relax each day.     My support system    Clinic Contact:  Phone number:    Contact 1:  Phone number:    Contact 2:  Phone number:    Church/:  Phone number:    Therapist:  Phone number:    Local crisis center:    Phone number:    Other community support:  Phone number:

## 2018-11-23 NOTE — MR AVS SNAPSHOT
After Visit Summary   11/23/2018    Taisha Yanez    MRN: [id number]           Patient Information     Date Of Birth          1960        Visit Information        Provider Department      11/23/2018 9:20 AM Cathryn Hills PA-C [facility]        Today's Diagnoses     Milia    -  1    Moderate major depression (H)        Atrophic vaginitis          Care Instructions      Atrophic Vaginitis    Atrophic vaginitis means the walls of your vagina have become thin. This happens when your body makes too little of the hormone estrogen. Menopause or surgical removal of the ovaries are the most common causes for a drop in estrogen. Breastfeeding can also cause the hormone level to drop.  Symptoms of atrophic vaginitis include:    Dryness, soreness, burning, or itching in the vagina    Vaginal discharge  Sex can be uncomfortable, even painful. After sex, you may have bleeding from your vagina. You may also have burning or pain when you urinate.  Home care  Your healthcare provider may recommend 1 or more of these as treatment:    Vaginal creams, lotions, and lubricants. These products help relieve vaginal dryness. They don t need a prescription. They can be found in the personal care section of most pharmacies. Creams and lotions are used daily to help keep the vagina moist. Lubricants help reduce dryness and pain during sex. Choose water-based lubricants. Don t use petroleum jelly, mineral oil, or other oils. These increase the chance of infection.     Hormone therapy (HT). HT increases the amount of estrogen in your body. This can help manage or relieve symptoms. HT can be given in pill form. It may be given as a lotion, cream, ring put into the vagina, or a patch on the skin. The risks and benefits of HT vary for each woman. For instance, your risk may be higher if you have had breast cancer. Discuss this treatment with your healthcare provider. Not every woman can use HT.  You  don t need to give up (abstain from) sex. In fact, regular sex can help keep vaginal tissues healthy. Take steps to make sex more comfortable by using water-based lubricants.  Preventing infections  Atrophic vaginitis makes an infection of the vagina or the urinary tract more likely. To help reduce your risk:    Keep your genitals clean. When you bathe, wash the outside of your vagina with mild soap and water. Clean gently between the folds of your vagina.    Wipe from front to back after a bowel movement.    Don t douche unless your healthcare provider tells you to.    Avoid scented toilet paper, scented vaginal sprays, and scented tampons.    Avoid wearing clothes that are tight in the crotch. These include pantyhose, jeans, and leggings. Wear cotton underwear. Change it every day.  Follow-up care  Follow up with your healthcare provider, or as advised.  When to seek medical advice  Call your health care provider right away if any of these occur:    Fever of 100.4 F (38 C) or higher, or as directed by your healthcare provider    Symptoms don t go away or get worse even with treatment    Vaginal area swells or becomes painful    Vaginal area bleeds, but not because of your period    Bad-smelling discharge from the vagina    Pain or burning when you urinate or you have trouble passing urine    Open sores develop around vagina   Date Last Reviewed: 12/1/2016 2000-2018 [employer]. [address] [place] [address]. All rights reserved. This information is not intended as a substitute for professional medical care. Always follow your healthcare professional's instructions.                Follow-ups after your visit        Additional Services     DERMATOLOGY REFERRAL       Your provider has referred you to: DONNA: [facility] [phone]   [email or web address]    Please be aware that coverage of these services is subject to the terms and  limitations of your health insurance plan.  Call member services at your health plan with any benefit or coverage questions.      Please bring the following with you to your appointment:    (1) Any X-Rays, CTs or MRIs which have been performed.  Contact the facility where they were done to arrange for  prior to your scheduled appointment.    (2) List of current medications  (3) This referral request   (4) Any documents/labs given to you for this referral                  Who to contact     Normal or non-critical lab and imaging results will be communicated to you by [employer], letter or phone within 4 business days after the clinic has received the results. If you do not hear from us within 7 days, please contact the clinic through [employer] or phone. If you have a critical or abnormal lab result, we will notify you by phone as soon as possible.  Submit refill requests through [employer] or call your pharmacy and they will forward the refill request to us. Please allow 3 business days for your refill to be completed.          If you need to speak with a  for additional information , please call: [phone]           Additional Information About Your Visit        [employer] Information     [employer] gives you secure access to your electronic health record. If you see a primary care provider, you can also send messages to your care team and make appointments. If you have questions, please call your primary care clinic.  If you do not have a primary care provider, please call [phone] and they will assist you.        Care EveryWhere ID     This is your Care EveryWhere ID. This could be used by other organizations to access your [place] medical records  [id number]        Your Vitals Were     Pulse Temperature Respirations             72 98.3  F (36.8  C) (Tympanic) 18          Blood Pressure from Last 3 Encounters:   11/23/18 120/78   01/31/18 118/71   10/13/17 112/77    Weight from Last 3  Encounters:   07/05/17 150 lb (68 kg)              We Performed the Following     DEPRESSION ACTION PLAN (DAP)     DERMATOLOGY REFERRAL          Today's Medication Changes          These changes are accurate as of 11/23/18  9:41 AM.  If you have any questions, ask your nurse or doctor.               These medicines have changed or have updated prescriptions.        Dose/Directions    buPROPion 150 MG 24 hr tablet   Commonly known as:  WELLBUTRIN XL   This may have changed:  additional instructions   Used for:  Moderate major depression (H)   Changed by:  Cathryn Hills PA-C        Dose:  150 mg   Take 1 tablet (150 mg) by mouth every morning   Quantity:  90 tablet   Refills:  3       FLUoxetine 40 MG capsule   Commonly known as:  PROzac   This may have changed:  additional instructions   Used for:  Moderate major depression (H)   Changed by:  Cathryn Hills PA-C        Dose:  40 mg   Take 1 capsule (40 mg) by mouth daily   Quantity:  90 capsule   Refills:  3            Where to get your medicines      These medications were sent to [facility]  [place], [place] - [address]  [address] [place] [place] [address]     Phone:  [phone]     buPROPion 150 MG 24 hr tablet    FLUoxetine 40 MG capsule                Primary Care Provider Office Phone # Fax #    Cathryn Hills PA-C [phone] [address] [place] [address]        Equal Access to Services     [facility]: Hadii luis alberto solitario hadasho Soomaali, waaxda luqadaha, qaybta kaalmada adeegyada, hugh rose. So [facility] [phone].    ATENCIÓN: Si habla español, tiene a saini disposición servicios gratuitos de asistencia lingüística. More al [phone].    We comply with applicable federal civil rights laws and [place] laws. We do not discriminate on the basis of race, color, national origin, age, disability, sex, sexual orientation, or gender identity.            Thank you!      Thank you for choosing [facility]  for your care. Our goal is always to provide you with excellent care. Hearing back from our patients is one way we can continue to improve our services. Please take a few minutes to complete the written survey that you may receive in the mail after your visit with us. Thank you!             Your Updated Medication List - Protect others around you: Learn how to safely use, store and throw away your medicines at www.disposemymeds.org.          This list is accurate as of 11/23/18  9:41 AM.  Always use your most recent med list.                   Brand Name Dispense Instructions for use Diagnosis    buPROPion 150 MG 24 hr tablet    WELLBUTRIN XL    90 tablet    Take 1 tablet (150 mg) by mouth every morning    Moderate major depression (H)       cyclobenzaprine 10 MG tablet    FLEXERIL    42 tablet    Take 1 tablet (10 mg) by mouth 2 times daily as needed for muscle spasms    TMJ (temporomandibular joint syndrome)       dicyclomine 20 MG tablet    BENTYL    90 tablet    TAKE ONE TABLET BY MOUTH THREE TIMES A DAY WITH MEALS AS NEEDED. MAY ALSO TAKE BEFORE BED    Abdominal spasms       FLUoxetine 40 MG capsule    PROzac    90 capsule    Take 1 capsule (40 mg) by mouth daily    Moderate major depression (H)       fluticasone 50 MCG/ACT spray    FLONASE     2 sprays    Encounter for screening mammogram for breast cancer       ibuprofen 200 MG tablet    ADVIL/MOTRIN     Take 200-400 mg by mouth    Encounter for screening mammogram for breast cancer       RANITIDINE HCL PO       Abdominal spasms

## 2018-11-23 NOTE — PATIENT INSTRUCTIONS
Atrophic Vaginitis    Atrophic vaginitis means the walls of your vagina have become thin. This happens when your body makes too little of the hormone estrogen. Menopause or surgical removal of the ovaries are the most common causes for a drop in estrogen. Breastfeeding can also cause the hormone level to drop.  Symptoms of atrophic vaginitis include:    Dryness, soreness, burning, or itching in the vagina    Vaginal discharge  Sex can be uncomfortable, even painful. After sex, you may have bleeding from your vagina. You may also have burning or pain when you urinate.  Home care  Your healthcare provider may recommend 1 or more of these as treatment:    Vaginal creams, lotions, and lubricants. These products help relieve vaginal dryness. They don t need a prescription. They can be found in the personal care section of most pharmacies. Creams and lotions are used daily to help keep the vagina moist. Lubricants help reduce dryness and pain during sex. Choose water-based lubricants. Don t use petroleum jelly, mineral oil, or other oils. These increase the chance of infection.     Hormone therapy (HT). HT increases the amount of estrogen in your body. This can help manage or relieve symptoms. HT can be given in pill form. It may be given as a lotion, cream, ring put into the vagina, or a patch on the skin. The risks and benefits of HT vary for each woman. For instance, your risk may be higher if you have had breast cancer. Discuss this treatment with your healthcare provider. Not every woman can use HT.  You don t need to give up (abstain from) sex. In fact, regular sex can help keep vaginal tissues healthy. Take steps to make sex more comfortable by using water-based lubricants.  Preventing infections  Atrophic vaginitis makes an infection of the vagina or the urinary tract more likely. To help reduce your risk:    Keep your genitals clean. When you bathe, wash the outside of your vagina with mild soap and water.  Clean gently between the folds of your vagina.    Wipe from front to back after a bowel movement.    Don t douche unless your healthcare provider tells you to.    Avoid scented toilet paper, scented vaginal sprays, and scented tampons.    Avoid wearing clothes that are tight in the crotch. These include pantyhose, jeans, and leggings. Wear cotton underwear. Change it every day.  Follow-up care  Follow up with your healthcare provider, or as advised.  When to seek medical advice  Call your health care provider right away if any of these occur:    Fever of 100.4 F (38 C) or higher, or as directed by your healthcare provider    Symptoms don t go away or get worse even with treatment    Vaginal area swells or becomes painful    Vaginal area bleeds, but not because of your period    Bad-smelling discharge from the vagina    Pain or burning when you urinate or you have trouble passing urine    Open sores develop around vagina   Date Last Reviewed: 12/1/2016 2000-2018 [employer]. [address] [place] [address], [place] [address]. All rights reserved. This information is not intended as a substitute for professional medical care. Always follow your healthcare professional's instructions.

## 2018-11-24 ASSESSMENT — ANXIETY QUESTIONNAIRES: GAD7 TOTAL SCORE: 0

## 2019-03-06 ENCOUNTER — VIRTUAL VISIT (OUTPATIENT)
Dept: FAMILY MEDICINE | Facility: OTHER | Age: 59
End: 2019-03-06

## 2019-03-06 NOTE — PROGRESS NOTES
"Date:   Clinician: Parker Hanna  Clinician NPI: [id number]  Patient: Taisha Yanez  Patient : 1960  Patient Address: [address] [place] [address]  Patient Phone: [phone]  Visit Protocol: URI  Patient Summary:  Taisha Powell is a 58 year old ( : 1960 ) female who initiated a Visit for cold, sinus infection, or influenza. When asked the question \"Please sign me up to receive news, health information and promotions. \", Taisha Powell responded \"No\".   A synchronous visit is necessary because the patient reported the following abnormal symptoms:   Bloody mucus (requires clarification)   Taisha Powell states her symptoms started gradually 10-13 days ago.   Her symptoms consist of facial pain or pressure, a cough, rhinitis, and nasal congestion.   Symptom details     Nasal secretions: The color of her mucus is yellow, clear, white, and bloody.    Cough: Taisha Powell coughs a few times an hour and her cough is not more bothersome at night. Phlegm comes into her throat when she coughs. She believes the phlegm causes the cough. The color of the phlegm is yellow and white.     Facial pain or pressure: The facial pain or pressure does not feel worse when bending or leaning forward.      Taisha Powell denies having fever, malaise, chills, wheezing, teeth pain, myalgias, headache, sore throat, and ear pain. She also denies double sickening (worsening symptoms after initial improvement), having a sinus infection within the past year, taking antibiotic medication for the symptoms, and having recent facial or sinus surgery in the past 60 days. She is not experiencing dyspnea.   She has not recently been exposed to someone with influenza. Taisha Powell has not been in close contact with any high risk individuals.   Weight: 160 lbs   Taisha Powell does not smoke or use smokeless tobacco.   Additional information as reported by the patient (free text): Started with low grade fever and sinus headache   3 days last " several days blowing blood with mucus and small blood clots--more than I've ever seen in past. A few years ago had Sinusitis for about 3 months. Diagnosed after CT scan. Specialist did not see any issue by just regular nasal exam.   MEDICATIONS: Zantac 75 oral, diphenhydramine oral, ibuprofen oral, Sudafed oral, Wellbutrin XL oral, Prozac oral, ALLERGIES: codeine  Clinician Response:  Dear Taisha Powell,  I am sorry you are not feeling well. To determine the most appropriate care for you, I would like you to be seen in person to further discuss your health history and symptoms.  You will not be charged for this Visit. Thank you for trusting us with your care.   Diagnosis: Refer for additional evaluation  Diagnosis ICD: R69  Triage Notes: Bloody mucus fails protocol, will be seen in clinic  Synchronous Triage: phone, status: completed, duration: 232 seconds

## 2019-03-08 ENCOUNTER — OFFICE VISIT (OUTPATIENT)
Dept: FAMILY MEDICINE | Facility: CLINIC | Age: 59
End: 2019-03-08
Payer: COMMERCIAL

## 2019-03-08 VITALS
TEMPERATURE: 98.3 F | RESPIRATION RATE: 16 BRPM | HEART RATE: 80 BPM | DIASTOLIC BLOOD PRESSURE: 70 MMHG | SYSTOLIC BLOOD PRESSURE: 104 MMHG

## 2019-03-08 DIAGNOSIS — J01.90 ACUTE SINUSITIS WITH SYMPTOMS > 10 DAYS: Primary | ICD-10-CM

## 2019-03-08 PROCEDURE — 99213 OFFICE O/P EST LOW 20 MIN: CPT | Performed by: PHYSICIAN ASSISTANT

## 2019-03-08 RX ORDER — OXYMETAZOLINE HYDROCHLORIDE 0.05 G/100ML
2 SPRAY NASAL 2 TIMES DAILY
Qty: 1 BOTTLE | Refills: 0 | Status: SHIPPED | OUTPATIENT
Start: 2019-03-08 | End: 2019-12-16

## 2019-03-08 NOTE — PROGRESS NOTES
SUBJECTIVE:   Taisha Yanez is a 58 year old female who presents to clinic today for the following health issues:      ENT Symptoms             Symptoms: cc Present Absent Comment   Fever/Chills  x  At onset 2.5 weeeks ago   Fatigue  x     Muscle Aches   x    Eye Irritation  x     Sneezing   x    Nasal Nathan/Drg x x     Sinus Pressure/Pain x x     Loss of smell  x  Has improved   Dental pain   x    Sore Throat  x  At onset, has subsided   Swollen Glands   x    Ear Pain/Fullness  x     Cough  x  From the drainage   Wheeze   x    Chest Pain   x    Shortness of breath  x  At onset   Rash   x    Other  x  Did online visit, has now been getting clots when she blows her nose which prompted her to come in. x4days     Symptom duration:  2.5 weeks   Symptom severity:  mod   Treatments tried:  Mucinex, sudafed, benadryl, ibuprofen with some to no relief.    Contacts:  Son and DIL has been sick with URIs. Granddaughter has pink eye and she watched her yesterday     Saline nasal spray.  Has not used flonase in a while, before was PRN.              Problem list and histories reviewed & adjusted, as indicated.  Additional history: as documented    Patient Active Problem List   Diagnosis     Cellulitis of foot, right     TMJ (temporomandibular joint syndrome)     Moderate major depression (H)     Anxiety     Chronic sinusitis, unspecified location     Lumbago     Atrophic vaginitis     Gastroesophageal reflux disease with esophagitis     IBS (irritable bowel syndrome)     Seasonal allergies     Past Surgical History:   Procedure Laterality Date     HYSTERECTOMY, PAP NO LONGER INDICATED         Social History     Tobacco Use     Smoking status: Never Smoker     Smokeless tobacco: Never Used   Substance Use Topics     Alcohol use: Yes     Family History   Problem Relation Age of Onset     Eye Disorder Father         macular degeneration         Current Outpatient Medications   Medication Sig Dispense Refill      amoxicillin-clavulanate (AUGMENTIN) 875-125 MG tablet Take 1 tablet by mouth 2 times daily 20 tablet 0     buPROPion (WELLBUTRIN XL) 150 MG 24 hr tablet Take 1 tablet (150 mg) by mouth every morning 90 tablet 3     cyclobenzaprine (FLEXERIL) 10 MG tablet Take 1 tablet (10 mg) by mouth 2 times daily as needed for muscle spasms 42 tablet 0     dicyclomine (BENTYL) 20 MG tablet TAKE ONE TABLET BY MOUTH THREE TIMES A DAY WITH MEALS AS NEEDED. MAY ALSO TAKE BEFORE BED 90 tablet 1     FLUoxetine (PROZAC) 40 MG capsule Take 1 capsule (40 mg) by mouth daily 90 capsule 3     fluticasone (FLONASE) 50 MCG/ACT spray 2 sprays       ibuprofen (ADVIL/MOTRIN) 200 MG tablet Take 200-400 mg by mouth       oxymetazoline (AFRIN) 0.05 % nasal spray Spray 2 sprays into both nostrils 2 times daily Do not use for more than 3 consecutive days. 1 Bottle 0     RANITIDINE HCL PO        BP Readings from Last 3 Encounters:   03/08/19 104/70   11/23/18 120/78   01/31/18 118/71    Wt Readings from Last 3 Encounters:   07/05/17 68 kg (150 lb)                    Reviewed and updated as needed this visit by clinical staff  Tobacco  Allergies  Meds  Med Hx  Surg Hx  Fam Hx  Soc Hx      Reviewed and updated as needed this visit by Provider         ROS:  Constitutional, HEENT, cardiovascular, pulmonary, gi and gu systems are negative, except as otherwise noted.    OBJECTIVE:     /70   Pulse 80   Temp 98.3  F (36.8  C) (Tympanic)   Resp 16   There is no height or weight on file to calculate BMI.  GENERAL: healthy, alert and no distress  EYES: Eyes grossly normal to inspection, PERRL and conjunctivae and sclerae normal  HENT: ear canals and TM's normal, nose and mouth without ulcers or lesions, tenderness over frontal sinuses  NECK: no adenopathy, no asymmetry, masses, or scars and thyroid normal to palpation  RESP: lungs clear to auscultation - no rales, rhonchi or wheezes  CV: regular rate and rhythm, normal S1 S2, no S3 or S4, no murmur,  click or rub, no peripheral edema and peripheral pulses strong  Diagnostic Test Results:  none     ASSESSMENT/PLAN:             1. Acute sinusitis with symptoms > 10 days  SINUSITIS    * Antibiotics indicated, see orders.  * I discussed the pathophysiology of sinus pressure/sinusitis and treatment options with emphasis on healthy lifestyle and opening up natural drainage passages.  I discussed the risks and benefits of various over the counter and prescription treatments including short courses of decongestant nasal sprays.  If new, worsening or persistent symptoms, the patient is to call or return for a recheck.          - amoxicillin-clavulanate (AUGMENTIN) 875-125 MG tablet; Take 1 tablet by mouth 2 times daily  Dispense: 20 tablet; Refill: 0  - oxymetazoline (AFRIN) 0.05 % nasal spray; Spray 2 sprays into both nostrils 2 times daily Do not use for more than 3 consecutive days.  Dispense: 1 Bottle; Refill: 0        Cathryn Hills PA-C  [facility]

## 2019-03-08 NOTE — NURSING NOTE
"Initial /70   Pulse 80   Temp 98.3  F (36.8  C) (Tympanic)   Resp 16  Estimated body mass index is 24.96 kg/m  as calculated from the following:    Height as of 1/31/18: 1.651 m (5' 5\").    Weight as of 7/5/17: 68 kg (150 lb). .    Annie Campos, CMA on 3/8/2019 at 8:40 AM    "

## 2019-11-08 ENCOUNTER — TELEPHONE (OUTPATIENT)
Dept: FAMILY MEDICINE | Facility: CLINIC | Age: 59
End: 2019-11-08

## 2019-11-08 DIAGNOSIS — L91.8 SKIN TAG: Primary | ICD-10-CM

## 2019-11-08 NOTE — TELEPHONE ENCOUNTER
----- Message -----   From: Taisha Yanez   Sent: 10/4/2019  10:38 AM CDT   To: Fl Referral   Subject: Referral Request                                   Taisha Yanez would like to request a referral.   Reason: Dermatology   Requested provider: Camila Huddleston   Comment:   Set an appointment to have skin cancer check and freeze skin tags.   I have seen her before.     Generic referral placed  Cathryn Hills PA-C

## 2019-11-13 ENCOUNTER — PATIENT OUTREACH (OUTPATIENT)
Dept: FAMILY MEDICINE | Facility: CLINIC | Age: 59
End: 2019-11-13

## 2019-11-13 PROBLEM — M54.50 LUMBAGO: Status: RESOLVED | Noted: 2018-02-14 | Resolved: 2019-11-13

## 2019-11-13 NOTE — PROGRESS NOTES
Panel Management Review        Composite cancer screening  Chart review shows that this patient is due/due soon for the following Mammogram after 12/01/19 and Colonoscopy  Summary:    Patient is due/failing the following:   COLONOSCOPY, PHQ9 and PHYSICAL    Action needed:   Patient needs office visit for physical and Patient needs to do PHQ9.    Type of outreach:    Sent letter.    Questions for provider review:    [facility]     Chart routed to none .

## 2019-11-17 ENCOUNTER — TELEPHONE (OUTPATIENT)
Dept: FAMILY MEDICINE | Facility: CLINIC | Age: 59
End: 2019-11-17

## 2019-11-17 DIAGNOSIS — F32.1 MODERATE MAJOR DEPRESSION (H): ICD-10-CM

## 2019-11-19 NOTE — TELEPHONE ENCOUNTER
"Requested Prescriptions   Pending Prescriptions Disp Refills     FLUoxetine (PROZAC) 40 MG capsule [Pharmacy Med Name: FLUoxetine HCl Oral Capsule 40 MG] 90 capsule 2     Sig: Take 1 capsule (40 mg) by mouth daily  Last Written Prescription Date:  11/23/2018 #90 x 3  Last filled 08/22/2019 #90 x 2  Last office visit: 3/8/2019 [facility]   Future Office Visit:  None         SSRIs Protocol Failed - 11/17/2019  7:08 AM        Failed - PHQ-9 score less than 5 in past 6 months     Please review last PHQ-9 score.   PHQ-9 SCORE 7/5/2017 11/23/2018   PHQ-9 Total Score 2 0       DREW-7 SCORE 7/5/2017 11/23/2018   Total Score 0 0                 Failed - Recent (6 mo) or future (30 days) visit within the authorizing provider's specialty     Patient had office visit in the last 6 months or has a visit in the next 30 days with authorizing provider or within the authorizing provider's specialty.  See \"Patient Info\" tab in inbasket, or \"Choose Columns\" in Meds & Orders section of the refill encounter.            Passed - Medication is Bupropion     If the medication is Bupropion (Wellbutrin), and the patient is taking for smoking cessation; OK to refill.          Passed - Medication is active on med list        Passed - Patient is age 18 or older        Passed - No active pregnancy on record        Passed - No positive pregnancy test in last 12 months        buPROPion (WELLBUTRIN XL) 150 MG 24 hr tablet [Pharmacy Med Name: buPROPion HCl ER (XL) Oral Tablet Extended Release 24 Hour 150 MG] 90 tablet 2     Sig: Take 1 tablet (150 mg) by mouth every morning  Last Written Prescription Date:  11/23/2018 #90 x 3  Last filled 08/22/2019 #90 x 2  Last office visit: 3/8/2019 [facility]   Future Office Visit:  None         SSRIs Protocol Failed - 11/17/2019  7:08 AM        Failed - PHQ-9 score less than 5 in past 6 months     Please review last PHQ-9 score.   PHQ-9 SCORE 7/5/2017 11/23/2018   PHQ-9 Total Score 2 0       DREW-7 SCORE 7/5/2017 " "11/23/2018   Total Score 0 0                 Failed - Recent (6 mo) or future (30 days) visit within the authorizing provider's specialty     Patient had office visit in the last 6 months or has a visit in the next 30 days with authorizing provider or within the authorizing provider's specialty.  See \"Patient Info\" tab in inbasket, or \"Choose Columns\" in Meds & Orders section of the refill encounter.            Passed - Medication is Bupropion     If the medication is Bupropion (Wellbutrin), and the patient is taking for smoking cessation; OK to refill.          Passed - Medication is active on med list        Passed - Patient is age 18 or older        Passed - No active pregnancy on record        Passed - No positive pregnancy test in last 12 months          "

## 2019-11-20 NOTE — TELEPHONE ENCOUNTER
MLforpt to callback needs to schedule appt with [facility] will then do short fill   LUIS Leigh  RN/[facility]

## 2019-11-21 NOTE — TELEPHONE ENCOUNTER
Routing refill request to provider for review/approval because:  Routing to provider to address. Patient taking antidepressants . Bridget Castañeda RN

## 2019-11-25 RX ORDER — FLUOXETINE 40 MG/1
40 CAPSULE ORAL DAILY
Qty: 90 CAPSULE | Refills: 0 | Status: SHIPPED | OUTPATIENT
Start: 2019-11-25 | End: 2019-12-16

## 2019-11-25 RX ORDER — BUPROPION HYDROCHLORIDE 150 MG/1
150 TABLET ORAL EVERY MORNING
Qty: 90 TABLET | Refills: 0 | Status: SHIPPED | OUTPATIENT
Start: 2019-11-25 | End: 2019-12-16

## 2019-11-25 NOTE — TELEPHONE ENCOUNTER
Please call patient, due for recheck for further refills, please assist patient in scheduling appt (may do with physical if she would like).  Rissa refill given.       Cathryn Hills PA-C

## 2019-12-02 ENCOUNTER — TELEPHONE (OUTPATIENT)
Dept: FAMILY MEDICINE | Facility: CLINIC | Age: 59
End: 2019-12-02

## 2019-12-02 DIAGNOSIS — Z12.11 SPECIAL SCREENING FOR MALIGNANT NEOPLASMS, COLON: Primary | ICD-10-CM

## 2019-12-02 NOTE — TELEPHONE ENCOUNTER
Reason for Call: Request for an order or referral:    Order or referral being requested: colonoscopy orders    Date needed: as soon as possible    Has the patient been seen by the PCP for this problem? Not Applicable    Additional comments: na    Phone number Patient can be reached at:  Home number on file [phone] (home)    Best Time:  na    Can we leave a detailed message on this number?  Not Applicable    Call taken on 12/2/2019 at 11:29 AM by Alize Gill

## 2019-12-16 ENCOUNTER — TELEPHONE (OUTPATIENT)
Dept: FAMILY MEDICINE | Facility: CLINIC | Age: 59
End: 2019-12-16

## 2019-12-16 ENCOUNTER — OFFICE VISIT (OUTPATIENT)
Dept: FAMILY MEDICINE | Facility: CLINIC | Age: 59
End: 2019-12-16
Payer: COMMERCIAL

## 2019-12-16 VITALS
RESPIRATION RATE: 16 BRPM | OXYGEN SATURATION: 96 % | HEART RATE: 83 BPM | TEMPERATURE: 98.3 F | SYSTOLIC BLOOD PRESSURE: 102 MMHG | DIASTOLIC BLOOD PRESSURE: 64 MMHG

## 2019-12-16 DIAGNOSIS — M17.12 PRIMARY OSTEOARTHRITIS OF LEFT KNEE: ICD-10-CM

## 2019-12-16 DIAGNOSIS — F32.1 MODERATE MAJOR DEPRESSION (H): Primary | ICD-10-CM

## 2019-12-16 DIAGNOSIS — R10.9 ABDOMINAL SPASMS: ICD-10-CM

## 2019-12-16 DIAGNOSIS — K21.00 GASTROESOPHAGEAL REFLUX DISEASE WITH ESOPHAGITIS: ICD-10-CM

## 2019-12-16 DIAGNOSIS — Z80.3 FAMILY HISTORY OF BREAST CANCER IN FIRST DEGREE RELATIVE: ICD-10-CM

## 2019-12-16 DIAGNOSIS — F41.9 ANXIETY: ICD-10-CM

## 2019-12-16 DIAGNOSIS — K58.9 IRRITABLE BOWEL SYNDROME, UNSPECIFIED TYPE: ICD-10-CM

## 2019-12-16 DIAGNOSIS — Z12.31 ENCOUNTER FOR SCREENING MAMMOGRAM FOR BREAST CANCER: ICD-10-CM

## 2019-12-16 PROCEDURE — 99214 OFFICE O/P EST MOD 30 MIN: CPT | Performed by: PHYSICIAN ASSISTANT

## 2019-12-16 RX ORDER — BUPROPION HYDROCHLORIDE 150 MG/1
150 TABLET ORAL EVERY MORNING
Qty: 90 TABLET | Refills: 3 | Status: SHIPPED | OUTPATIENT
Start: 2019-12-16 | End: 2021-02-16

## 2019-12-16 RX ORDER — FLUOXETINE 40 MG/1
40 CAPSULE ORAL DAILY
Qty: 90 CAPSULE | Refills: 3 | Status: SHIPPED | OUTPATIENT
Start: 2019-12-16 | End: 2021-02-16

## 2019-12-16 RX ORDER — DICYCLOMINE HCL 20 MG
TABLET ORAL
Qty: 45 TABLET | Refills: 1 | Status: SHIPPED | OUTPATIENT
Start: 2019-12-16 | End: 2021-11-01

## 2019-12-16 ASSESSMENT — ANXIETY QUESTIONNAIRES
6. BECOMING EASILY ANNOYED OR IRRITABLE: NOT AT ALL
2. NOT BEING ABLE TO STOP OR CONTROL WORRYING: NOT AT ALL
5. BEING SO RESTLESS THAT IT IS HARD TO SIT STILL: NOT AT ALL
IF YOU CHECKED OFF ANY PROBLEMS ON THIS QUESTIONNAIRE, HOW DIFFICULT HAVE THESE PROBLEMS MADE IT FOR YOU TO DO YOUR WORK, TAKE CARE OF THINGS AT HOME, OR GET ALONG WITH OTHER PEOPLE: NOT DIFFICULT AT ALL
3. WORRYING TOO MUCH ABOUT DIFFERENT THINGS: NOT AT ALL
GAD7 TOTAL SCORE: 0
1. FEELING NERVOUS, ANXIOUS, OR ON EDGE: NOT AT ALL
7. FEELING AFRAID AS IF SOMETHING AWFUL MIGHT HAPPEN: NOT AT ALL

## 2019-12-16 ASSESSMENT — PATIENT HEALTH QUESTIONNAIRE - PHQ9
SUM OF ALL RESPONSES TO PHQ QUESTIONS 1-9: 3
5. POOR APPETITE OR OVEREATING: NOT AT ALL

## 2019-12-16 NOTE — TELEPHONE ENCOUNTER
Panel Management Review        Composite cancer screening  Chart review shows that this patient is due/due soon for the following Colonoscopy and mammogram  Summary:    Patient is due/failing the following:   PHYSICAL    Action needed:   Patient needs office visit for physical.    Type of outreach:    Spoke to patient while in for med check     Questions for provider review:    None                                                                                                                                    Mag Morelos CMA

## 2019-12-16 NOTE — PROGRESS NOTES
Subjective     Taisha Yanez is a 58 year old female who presents to clinic today for the following health issues:    HPI   Depression and Anxiety Follow-Up    How are you doing with your depression since your last visit? Improved     How are you doing with your anxiety since your last visit?  Improved     Are you having other symptoms that might be associated with depression or anxiety? No    Have you had a significant life event? No     Do you have any concerns with your use of alcohol or other drugs? No    Social History     Tobacco Use     Smoking status: Never Smoker     Smokeless tobacco: Never Used   Substance Use Topics     Alcohol use: Yes     Drug use: No     PHQ 7/5/2017 11/23/2018   PHQ-9 Total Score 2 0   Q9: Thoughts of better off dead/self-harm past 2 weeks Not at all Not at all     DREW-7 SCORE 7/5/2017 11/23/2018   Total Score 0 0     Last PHQ-9 11/23/2018   1.  Little interest or pleasure in doing things 0   2.  Feeling down, depressed, or hopeless 0   3.  Trouble falling or staying asleep, or sleeping too much 0   4.  Feeling tired or having little energy 0   5.  Poor appetite or overeating 0   6.  Feeling bad about yourself 0   7.  Trouble concentrating 0   8.  Moving slowly or restless 0   Q9: Thoughts of better off dead/self-harm past 2 weeks 0   PHQ-9 Total Score 0   Difficulty at work, home, or with people -     DREW-7  11/23/2018   1. Feeling nervous, anxious, or on edge 0   2. Not being able to stop or control worrying 0   3. Worrying too much about different things 0   4. Trouble relaxing 0   5. Being so restless that it is hard to sit still 0   6. Becoming easily annoyed or irritable 0   7. Feeling afraid, as if something awful might happen 0   DREW-7 Total Score 0   If you checked any problems, how difficult have they made it for you to do your work, take care of things at home, or get along with other people? -         Suicide Assessment Five-step Evaluation and Treatment  (SAFE-T)      How many servings of fruits and vegetables do you eat daily?  0-1    On average, how many sweetened beverages do you drink each day (Examples: soda, juice, sweet tea, etc.  Do NOT count diet or artificially sweetened beverages)?   0    How many days per week do you miss taking your medication? 0    *Left knee problems x1 month. Painful with certain movements. No injury. No swelling.  Denies:weakness  She has been diagnosed with arthritis in her neck when she was in her 30's.        Mag Morelos CMA    Has some pain with her left knee with certain positions (mainly when sitting cross legged). Pain is mild.      Uses Bentyl PRN for abdominal spasms/irritation (very rarely) and mainly if she is sitting for long periods of time.     GERD:  Using ranitidine PRN.  She has noticed that has worsened with time.  Symptoms are worse during the day.  No blood or black in stools.   Family history of hiatal hernia.  Never had an EGD.      Mom recently diagnosed with breast cancer (at age 82).  Taisha has a mammo scheduled.           Patient Active Problem List   Diagnosis     Cellulitis of foot, right     TMJ (temporomandibular joint syndrome)     Moderate major depression (H)     Anxiety     Chronic sinusitis, unspecified location     Atrophic vaginitis     Gastroesophageal reflux disease with esophagitis     IBS (irritable bowel syndrome)     Seasonal allergies     Primary osteoarthritis of left knee     Abdominal spasms     Family history of breast cancer in first degree relative     Past Surgical History:   Procedure Laterality Date     HYSTERECTOMY, PAP NO LONGER INDICATED         Social History     Tobacco Use     Smoking status: Never Smoker     Smokeless tobacco: Never Used   Substance Use Topics     Alcohol use: Yes     Family History   Problem Relation Age of Onset     Eye Disorder Father         macular degeneration     Breast Cancer Mother 82         Current Outpatient Medications   Medication Sig Dispense  Refill     buPROPion (WELLBUTRIN XL) 150 MG 24 hr tablet Take 1 tablet (150 mg) by mouth every morning 90 tablet 3     dicyclomine (BENTYL) 20 MG tablet TAKE ONE TABLET BY MOUTH THREE TIMES A DAY WITH MEALS AS NEEDED. MAY ALSO TAKE BEFORE BED 45 tablet 1     FLUoxetine (PROZAC) 40 MG capsule Take 1 capsule (40 mg) by mouth daily 90 capsule 3     fluticasone (FLONASE) 50 MCG/ACT spray 2 sprays       ibuprofen (ADVIL/MOTRIN) 200 MG tablet Take 200-400 mg by mouth       ranitidine (ZANTAC) 150 MG capsule Take 1 capsule (150 mg) by mouth daily as needed for heartburn       BP Readings from Last 3 Encounters:   12/16/19 102/64   03/08/19 104/70   11/23/18 120/78    Wt Readings from Last 3 Encounters:   07/05/17 68 kg (150 lb)                      Reviewed and updated as needed this visit by Provider  Jose Daniel Recinos         Review of Systems   ROS COMP: Constitutional, HEENT, cardiovascular, pulmonary, GI, , musculoskeletal, neuro, skin, endocrine and psych systems are negative, except as otherwise noted.      Objective    /64   Pulse 83   Temp 98.3  F (36.8  C) (Tympanic)   Resp 16   SpO2 96%   Breastfeeding No   There is no height or weight on file to calculate BMI.  Physical Exam   GENERAL: healthy, alert and no distress  NECK: no adenopathy, no asymmetry, masses, or scars and thyroid normal to palpation  RESP: lungs clear to auscultation - no rales, rhonchi or wheezes  CV: regular rate and rhythm, normal S1 S2, no S3 or S4, no murmur, click or rub, no peripheral edema and peripheral pulses strong  ABDOMEN: soft, nontender, no hepatosplenomegaly, no masses and bowel sounds normal  MS: no gross musculoskeletal defects noted, no edema    GAIT: NORMAL  Dorsalis Pedis pulses intact bilaterally.  MUSCULOSKELETAL:  LEFT KNEE   Inspection: AP/lateral alignment normal, No effusion  Tender: medial joint line.   Non-tender: rest of knee  Active Range of Motion: full flexion, full extension, no crepitus  Strength:  full  Special tests: normal Valgus stress test, negative Lachman's test  No warmth noted over bilateral knees.               Diagnostic Test Results:  none         Assessment & Plan     1. Moderate major depression (H)  Doing well on prozac and wellbutrin, will leave as is  - FLUoxetine (PROZAC) 40 MG capsule; Take 1 capsule (40 mg) by mouth daily  Dispense: 90 capsule; Refill: 3  - buPROPion (WELLBUTRIN XL) 150 MG 24 hr tablet; Take 1 tablet (150 mg) by mouth every morning  Dispense: 90 tablet; Refill: 3    2. Anxiety  As above.      3. Encounter for screening mammogram for breast cancer  I discussed in detail with Taisha Yanez the importance of breast cancer screening through monthly self breast exams and annual breast exams in the clinic.     - *MA Screening Digital Bilateral    4. Abdominal spasms  Uses very sparingly.   - dicyclomine (BENTYL) 20 MG tablet; TAKE ONE TABLET BY MOUTH THREE TIMES A DAY WITH MEALS AS NEEDED. MAY ALSO TAKE BEFORE BED  Dispense: 45 tablet; Refill: 1      5. Gastroesophageal reflux disease with esophagitis  - ranitidine (ZANTAC) 150 MG capsule; Take 1 capsule (150 mg) by mouth daily as needed for heartburn  - GASTROENTEROLOGY ADULT REF PROCEDURE ONLY [facility] [phone]; [place] [facility]    6. Irritable bowel syndrome, unspecified type  Uses bentyl sparingly.     7. Primary osteoarthritis of left knee  Osteoarthritis of the knees    I discussed the pathophysiology of osteoarthritis and the progressive nature of this disease.  I spent time discussing lifestyle changes that may help with this, including weight loss (low weight bearing exercises ideal such as swimming/elliptical machine or biking), wearing comfortable/support athletic shoes, avoiding overuse of the knees.  I recommend she start ROM/strengthening exercises/ physical therapy.        May consider imaging if pain persists/worsens.     Avoid positions that worsen the pain.    8. Family history of breast  cancer in first degree relative  Has screening mammo scheduled.              Return in about 1 year (around 12/16/2020) for Physical Exam.    Cathryn Hills PA-C  [facility]

## 2019-12-16 NOTE — PATIENT INSTRUCTIONS
Schedule an EGD to evaluate reflux further.     Consider Hep A shot    Osteoarthritis:  You can try an OTC arthritis cream such as Australian Dreams, Tiro [place], Bengay, ect. Avoid positions that cause the pain.   Try low impact exercising (walking, pool, elipitical).   Stretching.  Patient Education     Quad Set for Leg and Knee    This exercise is designed to stretch and strengthen your knee. Before beginning, read through all the instructions. While exercising, breathe normally and use smooth movements. If you feel any pain, stop the exercise. If pain continues, call your healthcare provider.  1. Sit on the floor with one leg straight, the other bent.  2. Flex the foot of your straight leg by pointing your toes toward you. Press the back of your knee into the floor while tightening the muscle on the top of your thigh. Hold for 3 to 5 seconds. Then relax.  3. Repeat 10 to 15 times. Do 3 to 5 sets a day.  Caution    Don t arch your back.    Don t hunch your shoulders.  Date Last Reviewed: 2/1/2018 2000-2018 [employer]. [address] [place] [address]. All rights reserved. This information is not intended as a substitute for professional medical care. Always follow your healthcare professional's instructions.           Patient Education     Leg and Knee Exercises: Leg Lunge     This exercise is designed to stretch and strengthen your knee. Before beginning, read through all the instructions. Start with a warm-up, which can help prevent muscle soreness and improve coordination so you do not fall. While exercising, breathe normally and use smooth movements. If you feel any pain, stop the exercise. If pain persists, call your healthcare provider.  4. After a brief warm-up, such as brisk walking for a few minutes, stand with your feet shoulder-width apart.  5. With one foot, step out and lower yourself into a comfortable position. Keep your back straight and your feet pointing straight ahead.  As you step, the heel of the other foot lifts off the floor.  6. Return smoothly to your starting position.  7. Repeat ______ times on each side. Do ______ sets a day.  Caution    Don t let your forward knee go past your toes.    Don t lunge so far that your rear knee touches the floor.    Keep knees in line with the second toe.   Date Last Reviewed: 12/1/2017 2000-2018 [employer]. [address] [place] [address]. All rights reserved. This information is not intended as a substitute for professional medical care. Always follow your healthcare professional's instructions.           Patient Education     Leg and Knee Exercises: Leg Press    The following exercise helps build strong, balanced leg muscles. Make sure to adjust exercise machines as instructed by your physical therapist. He or she will tell you how many times to do the exercise.  Note: To prevent injury, always warm up and stretch before your strengthening exercises. Stop any exercise that causes pain. Discuss it with your physical therapist or healthcare provider.  Here are the steps for the leg press:    Start with your leg bent so your knee is at a 90-degree angle.    Push with your leg until it is almost completely straight. Be sure not to lock your knee.    Slowly and steadily return your leg to its original position.  Date Last Reviewed: 11/1/2017 2000-2018 [employer]. [address] [place] [address]. All rights reserved. This information is not intended as a substitute for professional medical care. Always follow your healthcare professional's instructions.           Patient Education     Leg and Knee Exercises: Step-Ups    This exercise is designed to stretch and strengthen your knee. Before starting, read through all the instructions. While exercising, breathe normally and use smooth movements. If you feel any pain, stop the exercise. If pain persists, tell your healthcare provider.  8. After  a brief warm-up, such as brisk walking for a few minutes, stand with one foot on a 3-inch to 5-inch support (such as a block of wood) and the other foot flat on the floor.  9. Shift your weight onto the foot on the block, straightening that knee and raising your other foot off the floor. Then slowly lower the foot until only the heel touches the floor.  10. Return to starting position.  11. Repeat ______ times on each side. Do ______ sets a day.    Don t lock your knees.    Keep your weight on the foot that s on the block-- don t push off from the floor.  Date Last Reviewed: 2/1/2018 2000-2018 [employer]. [address] [place] [address], [place] [address]. All rights reserved. This information is not intended as a substitute for professional medical care. Always follow your healthcare professional's instructions.

## 2019-12-17 ASSESSMENT — ANXIETY QUESTIONNAIRES: GAD7 TOTAL SCORE: 0

## 2019-12-23 ENCOUNTER — TELEPHONE (OUTPATIENT)
Dept: FAMILY MEDICINE | Facility: CLINIC | Age: 59
End: 2019-12-23

## 2019-12-23 NOTE — TELEPHONE ENCOUNTER
Reason for Call:  Procedure  has reached out to schedule diagnostic upper gi endoscopy    Detailed comments: patient has failed to return calls   No further action necessary for procedure  at this time    Phone Number Patient can be reached at: Home number on file [phone] (home)    Best Time: NA    Can we leave a detailed message on this number? Not Applicable    Call taken on 12/23/2019 at 9:02 AM by Alize Gill

## 2019-12-27 PROCEDURE — 77063 BREAST TOMOSYNTHESIS BI: CPT | Mod: TC

## 2019-12-27 PROCEDURE — 77067 SCR MAMMO BI INCL CAD: CPT | Mod: TC

## 2020-01-15 ENCOUNTER — MYC MEDICAL ADVICE (OUTPATIENT)
Dept: FAMILY MEDICINE | Facility: CLINIC | Age: 60
End: 2020-01-15

## 2020-01-15 DIAGNOSIS — M17.12 PRIMARY OSTEOARTHRITIS OF LEFT KNEE: Primary | ICD-10-CM

## 2020-01-15 DIAGNOSIS — Z13.6 CARDIOVASCULAR SCREENING; LDL GOAL LESS THAN 160: ICD-10-CM

## 2020-01-15 DIAGNOSIS — Z13.1 SCREENING FOR DIABETES MELLITUS: ICD-10-CM

## 2020-01-16 DIAGNOSIS — M17.12 PRIMARY OSTEOARTHRITIS OF LEFT KNEE: ICD-10-CM

## 2020-01-16 DIAGNOSIS — Z13.6 CARDIOVASCULAR SCREENING; LDL GOAL LESS THAN 160: ICD-10-CM

## 2020-01-16 DIAGNOSIS — Z13.1 SCREENING FOR DIABETES MELLITUS: ICD-10-CM

## 2020-01-16 LAB
CHOLEST SERPL-MCNC: 241 MG/DL
GLUCOSE SERPL-MCNC: 84 MG/DL (ref 70–99)
HDLC SERPL-MCNC: 72 MG/DL
LDLC SERPL CALC-MCNC: 156 MG/DL
NONHDLC SERPL-MCNC: 169 MG/DL
TRIGL SERPL-MCNC: 64 MG/DL

## 2020-01-16 PROCEDURE — 82947 ASSAY GLUCOSE BLOOD QUANT: CPT | Performed by: PHYSICIAN ASSISTANT

## 2020-01-16 PROCEDURE — 36415 COLL VENOUS BLD VENIPUNCTURE: CPT | Performed by: PHYSICIAN ASSISTANT

## 2020-01-16 PROCEDURE — 80061 LIPID PANEL: CPT | Performed by: PHYSICIAN ASSISTANT

## 2020-01-16 PROCEDURE — 86618 LYME DISEASE ANTIBODY: CPT | Performed by: PHYSICIAN ASSISTANT

## 2020-01-17 LAB — B BURGDOR IGG+IGM SER QL: 0.12 (ref 0–0.89)

## 2020-02-23 ENCOUNTER — HEALTH MAINTENANCE LETTER (OUTPATIENT)
Age: 60
End: 2020-02-23

## 2020-05-01 ENCOUNTER — E-VISIT (OUTPATIENT)
Dept: FAMILY MEDICINE | Facility: CLINIC | Age: 60
End: 2020-05-01
Payer: COMMERCIAL

## 2020-05-01 ENCOUNTER — MYC MEDICAL ADVICE (OUTPATIENT)
Dept: FAMILY MEDICINE | Facility: CLINIC | Age: 60
End: 2020-05-01

## 2020-05-01 DIAGNOSIS — L53.9 ERYTHEMA: ICD-10-CM
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
## 2020-05-01 PROCEDURE — 99421 OL DIG E/M SVC 5-10 MIN: CPT | Performed by: PHYSICIAN ASSISTANT

## 2020-05-03 RX ORDER — DOXYCYCLINE 100 MG/1
200 CAPSULE ORAL ONCE
Qty: 2 CAPSULE | Refills: 0 | Status: SHIPPED | OUTPATIENT
Start: 2020-05-03 | End: 2020-05-03

## 2020-05-04 ENCOUNTER — MYC MEDICAL ADVICE (OUTPATIENT)
Dept: FAMILY MEDICINE | Facility: CLINIC | Age: 60
End: 2020-05-04

## 2020-05-04 NOTE — TELEPHONE ENCOUNTER
Provider E-Visit time total (minutes):  6 minutes    Unfortunately, I am limited on further discussion to ascertain if criteria are fully met, however if she starts antibiotic tomorrow (Monday 5/4/20) we will still be within the 72 hour range.      No CI for doxy known.     [place].     Attached picture is consistent with deer tick.     Prophylaxis indicated, will start  Doxycycline 200 mg x 1 dose now.     Cathryn Hills PA-C

## 2020-09-24 ENCOUNTER — VIRTUAL VISIT (OUTPATIENT)
Dept: FAMILY MEDICINE | Facility: OTHER | Age: 60
End: 2020-09-24

## 2020-09-24 DIAGNOSIS — Z20.822 SUSPECTED 2019 NOVEL CORONAVIRUS INFECTION: Primary | ICD-10-CM

## 2020-09-24 NOTE — PROGRESS NOTES
"Date: 2020 08:43:03  Clinician: Sal Merrill  Clinician NPI: [id number]  Patient: Taisha Yanez  Patient : 1960  Patient Address: [address] [place] [address]  Patient Phone: [phone]  Visit Protocol: URI  Patient Summary:  Taisha Powell is a 59 year old ( : 1960 ) female who initiated a [facility] Visit for cold, sinus infection, or influenza. When asked the question \"Please sign me up to receive news, health information and promotions. \", Taisha Powell responded \"Yes\".    Taisha Powell states her symptoms started gradually 10-13 days ago. After her symptoms started, they improved and then got worse again.   Her symptoms consist of a cough, nasal congestion, a headache, facial pain or pressure, malaise, and a sore throat. Taisha Powell also feels feverish.   Symptom details     Nasal secretions: The color of her mucus is clear.    Cough: Taisha Powell coughs a few times an hour and her cough is not more bothersome at night. Phlegm comes into her throat when she coughs. She believes her cough is caused by post-nasal drip. The color of the phlegm is clear.     Sore throat: Taisha Powell reports having moderate throat pain (4-6 on a 10 point pain scale), does not have exudate on her tonsils, and can swallow liquids. She is not sure if the lymph nodes in her neck are enlarged. A rash has not appeared on the skin since the sore throat started.     Temperature: Her current temperature is 100 degrees Fahrenheit.     Facial pain or pressure: The facial pain or pressure feels worse when bending over or leaning forward.     Headache: She states the headache is mild (1-3 on a 10 point pain scale).      Taisha Powell denies having ear pain, anosmia, vomiting, rhinitis, nausea, wheezing, myalgias, chills, teeth pain, ageusia, and diarrhea. She also denies having a sinus infection within the past year, taking antibiotic medication in the past month, and having recent facial or sinus surgery in the past 60 days. She is " not experiencing dyspnea.   Precipitating events  Taisha Powell is not sure if she has been exposed to someone with strep throat. She has not recently been exposed to someone with influenza. Taisha Powell has been in close contact with the following high risk individuals: children under the age of 5.   Pertinent COVID-19 (Coronavirus) information  In the past 14 days, Taisha Powell has not worked in a congregate living setting.   She does not work or volunteer as healthcare worker or a  and does not work or volunteer in a healthcare facility.   Taisha Powell also has not lived in a congregate living setting in the past 14 days. She does not live with a healthcare worker.   Taisha Powell has not had a close contact with a laboratory-confirmed COVID-19 patient within 14 days of symptom onset.   Since December 2019, Taisha Powell and has not had upper respiratory infection or influenza-like illness. Has not been diagnosed with lab-confirmed COVID-19 test   Pertinent medical history  Taisha Powell does not get yeast infections when she takes antibiotics.   Taisha Powell does not need a return to work/school note.   Weight: 150 lbs   Taisha Powell does not smoke or use smokeless tobacco.   Additional information as reported by the patient (free text): Last week vomited on 2 separate days. In the past had Sinusitis diagnosed by ENT via scan. Physical exam reveleaved nothing. Feels like a sinus infection. Have some minor heaviness in lungs. I have chronic acid reflux. I have sinus pressure off and on year round. Fever comes and goes. Spent weekend with grandchild - had a positive Covid test in her class. She has no symptoms and no one in the rest of the family have symptoms.   Weight: 150 lbs    MEDICATIONS: diphenhydramine oral, Wellbutrin XL oral, Prozac oral, ibuprofen oral, Sudafed oral, Zantac 75 oral, ALLERGIES: codeine  Clinician Response:  Dear Taisha Powell,  Based on the information provided, you have acute bacterial sinusitis,  also known as a sinus infection. Sinus infections are caused by bacteria or a virus and symptoms are almost always identical. The difference between the 2 types of infections is timing.  Sinus infections start as viral infections and symptoms improve on their own in about 7 days. If symptoms have not improved after 7 days or have even worsened, a bacterial infection may have developed.  Medication information  I am prescribing:     Amoxicillin-pot clavulanate 875-125 mg oral tablet. Take 1 tablet by mouth every 12 hours for 10 days. There are no refills with this prescription.   Yeast infections can be a common side effect of antibiotics. The most common symptom of a yeast infection is itchiness in and around the vagina. Other signs and symptoms include burning, redness, or a thick, white vaginal discharge that looks like cottage cheese and does not have a bad smell.  Self care  Steps you can take to be as comfortable as possible:     Rest.    Drink plenty of fluids.    Take a warm shower to loosen congestion    Use a cool-mist humidifier.    Use throat lozenges.    Suck on frozen items such as popsicles.    Drink hot tea with lemon and honey.    Gargle with warm salt water (1/4 teaspoon of salt per 8 ounce glass of water).    Take a spoonful of honey to reduce your cough.     When to seek care  Please be seen in a clinic or urgent care if any of the following occur:     New symptoms develop, or symptoms become worse    Symptoms do not start to improve after 3 days of treatment     Call ahead before going to the clinic or urgent care.  It is possible to have an allergic reaction to an antibiotic even if you have not had one in the past. If you notice a new rash, significant swelling, or difficulty breathing, stop taking this medication immediately and go to a clinic or urgent care.  Call 911 or go to the emergency room if you feel that your throat is closing off, you suddenly develop a rash, you are unable to  "swallow fluids, you are drooling, or you are having difficulty breathing.  Additional treatment plan   Your symptoms show that you may have coronavirus (COVID-19). This illness can cause fever, cough and trouble breathing. Many people get a mild case and get better on their own. Some people can get very sick.  What should I do?  We would like to test you for this virus.   1. Please call [phone] to schedule your visit. Explain that you were referred by [facility] to have a COVID-19 test. Be ready to share your [facility] visit ID number.  The following will serve as your written order for this COVID Test, ordered by me, for the indication of suspected COVID [Z20.828]: The test will be ordered in [facility], our electronic health record, after you are scheduled. It will show as ordered and authorized by Robert Marie MD.  Order: COVID-19 (Coronavirus) PCR for SYMPTOMATIC testing from [facility].      2. When it's time for your COVID test:  Stay at least 6 feet away from others. (If someone will drive you to your test, stay in the backseat, as far away from the  as you can.)   Cover your mouth and nose with a mask, tissue or washcloth.  Go straight to the testing site. Don't make any stops on the way there or back.      3.Starting now: Stay home and away from others (self-isolate) until:   You've had no fever---and no medicine that reduces fever---for one full day (24 hours). And...   Your other symptoms have gotten better. For example, your cough or breathing has improved. And...   At least 10 days have passed since your symptoms started.       During this time, don't leave the house except for testing or medical care.   Stay in your own room, even for meals. Use your own bathroom if you can.   Stay away from others in your home. No hugging, kissing or shaking hands. No visitors.  Don't go to work, school or anywhere else.    Clean \"high touch\" surfaces often (doorknobs, counters, handles, etc.). Use a household cleaning spray " or wipes. You'll find a full list of  on the EPA website: www.epa.gov/pesticide-registration/list-n-disinfectants-use-against-sars-cov-2.   Cover your mouth and nose with a mask, tissue or washcloth to avoid spreading germs.  Wash your hands and face often. Use soap and water.  Caregivers in these groups are at risk for severe illness due to COVID-19:  o People 65 years and older  o People who live in a nursing home or long-term care facility  o People with chronic disease (lung, heart, cancer, diabetes, kidney, liver, immunologic)  o People who have a weakened immune system, including those who:   Are in cancer treatment  Take medicine that weakens the immune system, such as corticosteroids  Had a bone marrow or organ transplant  Have an immune deficiency  Have poorly controlled HIV or AIDS  Are obese (body mass index of 40 or higher)  Smoke regularly   o Caregivers should wear gloves while washing dishes, handling laundry and cleaning bedrooms and bathrooms.  o Use caution when washing and drying laundry: Don't shake dirty laundry, and use the warmest water setting that you can.  o For more tips, go to www.cdc.gov/coronavirus/2019-ncov/downloads/10Things.pdf.    How can I take care of myself?    Get lots of rest. Drink extra fluids (unless a doctor has told you not to).   Take Tylenol (acetaminophen) for fever or pain. If you have liver or kidney problems, ask your family doctor if it's okay to take Tylenol.   Adults can take either:    650 mg (two 325 mg pills) every 4 to 6 hours, or...   1,000 mg (two 500 mg pills) every 8 hours as needed.    Note: Don't take more than 3,000 mg in one day. Acetaminophen is found in many medicines (both prescribed and over-the-counter medicines). Read all labels to be sure you don't take too much.   For children, check the Tylenol bottle for the right dose. The dose is based on the child's age or weight.    If you have other health problems (like cancer, heart failure, an  organ transplant or severe kidney disease): Call your specialty clinic if you don't feel better in the next 2 days.       Know when to call 911. Emergency warning signs include:    Trouble breathing or shortness of breath Pain or pressure in the chest that doesn't go away Feeling confused like you haven't felt before, or not being able to wake up Bluish-colored lips or face.  Where can I get more information?   [facility] -- About COVID-19: [email or web address]   CDC -- What to Do If You're Sick: www.cdc.gov/coronavirus/2019-ncov/about/steps-when-sick.html   CDC -- Ending Home Isolation: www.cdc.gov/coronavirus/2019-ncov/hcp/disposition-in-home-patients.html   CDC -- Caring for Someone: www.cdc.gov/coronavirus/2019-ncov/if-you-are-sick/care-for-someone.html   [facility] -- Interim Guidance for Hospital Discharge to Home: www.[facility].mn./diseases/coronavirus/hcp/hospdischarge.pdf   [facility] clinical trials (COVID-19 research studies): clinicalaffairs.[facility]-clinical-trials    Below are the COVID-19 hotlines at the [facility] ([facility]). Interpreters are available.    For health questions: Call 722-681-2956 or 1-993.952.9831 (7 a.m. to 7 p.m.) For questions about schools and childcare: Call 207-117-6436 or 1-362.741.4663 (7 a.m. to 7 p.m.)    Diagnosis: Acute maxillary sinusitis, unspecified  Diagnosis ICD: J01.00  Prescription: amoxicillin-pot clavulanate 875-125 mg oral tablet 20 tablet, 10 days supply. Take 1 tablet by mouth every 12 hours for 10 days. Refills: 0, Refill as needed: no, Allow substitutions: yes  Pharmacy: [id number] - [phone] - [address],  [place] [phone]

## 2020-09-25 DIAGNOSIS — Z20.822 SUSPECTED 2019 NOVEL CORONAVIRUS INFECTION: ICD-10-CM

## 2020-09-25 PROCEDURE — U0003 INFECTIOUS AGENT DETECTION BY NUCLEIC ACID (DNA OR RNA); SEVERE ACUTE RESPIRATORY SYNDROME CORONAVIRUS 2 (SARS-COV-2) (CORONAVIRUS DISEASE [COVID-19]), AMPLIFIED PROBE TECHNIQUE, MAKING USE OF HIGH THROUGHPUT TECHNOLOGIES AS DESCRIBED BY CMS-2020-01-R: HCPCS | Performed by: FAMILY MEDICINE

## 2020-09-26 LAB
SARS-COV-2 RNA SPEC QL NAA+PROBE: NOT DETECTED
SPECIMEN SOURCE: NORMAL

## 2020-12-06 ENCOUNTER — HEALTH MAINTENANCE LETTER (OUTPATIENT)
Age: 60
End: 2020-12-06

## 2021-02-14 ENCOUNTER — TELEPHONE (OUTPATIENT)
Dept: FAMILY MEDICINE | Facility: CLINIC | Age: 61
End: 2021-02-14

## 2021-02-14 DIAGNOSIS — F32.1 MODERATE MAJOR DEPRESSION (H): ICD-10-CM

## 2021-02-16 NOTE — TELEPHONE ENCOUNTER
Routing refill request to provider for review/approval because:  Labs not current:  [id number]  Patient needs to be seen because it has been more than 1 year since last office visit.  12/16/19    Medication pended for approval, 30 day supply with reminder.

## 2021-02-17 RX ORDER — BUPROPION HYDROCHLORIDE 150 MG/1
150 TABLET ORAL EVERY MORNING
Qty: 30 TABLET | Refills: 0 | Status: SHIPPED | OUTPATIENT
Start: 2021-02-17 | End: 2021-03-11

## 2021-02-17 RX ORDER — FLUOXETINE 40 MG/1
40 CAPSULE ORAL DAILY
Qty: 30 CAPSULE | Refills: 0 | Status: SHIPPED | OUTPATIENT
Start: 2021-02-17 | End: 2021-03-11

## 2021-02-17 NOTE — TELEPHONE ENCOUNTER
Please call patient, due for recheck for further refills, please assist patient in scheduling appt (physical/med recheck).  Rissa refill given.       Cathryn Hills PA-C

## 2021-03-09 NOTE — PROGRESS NOTES
Taisha is a 60 year old who is being evaluated via a billable telephone visit.      What phone number would you like to be contacted at? [phone]  How would you like to obtain your AVS? Leilanihart    Assessment & Plan     Moderate major depression (H)  Depression and anxiety have been stable on current meds.  She would like to leave these as is (not interested in cutting back on dose, especially given that we are in the middle of a pandemic).    - buPROPion (WELLBUTRIN XL) 150 MG 24 hr tablet; Take 1 tablet (150 mg) by mouth every morning  - FLUoxetine (PROZAC) 40 MG capsule; Take 1 capsule (40 mg) by mouth daily    Gastroesophageal reflux disease with esophagitis without hemorrhage      Further evaluation with EGD suggested.   She has recently done a round of PPI therapy.    Continue with H2 blocker for now.     I have advised patient to eat smaller more frequent meals, avoid recumbency for 2-3 hours after eating, and avoid spicy/greasy foods.  Patient education materials given today (GERD).   Taisha Yanez is to f/u if she develops any blood or black stools or worsening abdominal pain.     Patient was instructed to f/u if symptoms worsen or fail to improve as anticipated.         - GASTROENTEROLOGY ADULT REF PROCEDURE ONLY; Future    Colon cancer screening  I discussed the importance of colorectal cancer screening, including the risks and benefits of the various procedures, including colonoscopy, cologuard and annual FOB immunoassay test.  Patient has opted to do another colonoscopy      - GASTROENTEROLOGY ADULT REF PROCEDURE ONLY; Future    Acute sinusitis with symptoms > 10 days  SINUSITIS    * Antibiotics indicated, see orders.  * I discussed the pathophysiology of sinus pressure/sinusitis and treatment options with emphasis on healthy lifestyle and opening up natural drainage passages.  I discussed the risks and benefits of various over the counter and prescription treatments including short courses of  decongestant nasal sprays.  If new, worsening or persistent symptoms, the patient is to call or return for a recheck.          - amoxicillin-clavulanate (AUGMENTIN) 875-125 MG tablet; Take 1 tablet by mouth 2 times daily for 10 days                 Return in about 3 months (around 6/11/2021) for Physical Exam.    LUCIANO Thomas [facility] MICHELLE Sumner is a 60 year old who presents for the following health issues     HPI       Depression and Anxiety Follow-Up    How are you doing with your depression since your last visit? Improved    How are you doing with your anxiety since your last visit?  Improved but    Are you having other symptoms that might be associated with depression or anxiety? No    Have you had a significant life event? No     Do you have any concerns with your use of alcohol or other drugs? No    Social History     Tobacco Use     Smoking status: Never Smoker     Smokeless tobacco: Never Used   Substance Use Topics     Alcohol use: Yes     Drug use: No     PHQ 11/23/2018 12/16/2019 3/11/2021   PHQ-9 Total Score 0 3 3   Q9: Thoughts of better off dead/self-harm past 2 weeks Not at all Not at all Not at all     DREW-7 SCORE 11/23/2018 12/16/2019 3/11/2021   Total Score 0 0 0     Last PHQ-9 3/11/2021   1.  Little interest or pleasure in doing things 0   2.  Feeling down, depressed, or hopeless 0   3.  Trouble falling or staying asleep, or sleeping too much 2   4.  Feeling tired or having little energy 0   5.  Poor appetite or overeating 1   6.  Feeling bad about yourself 0   7.  Trouble concentrating 0   8.  Moving slowly or restless 0   Q9: Thoughts of better off dead/self-harm past 2 weeks 0   PHQ-9 Total Score 3   Difficulty at work, home, or with people Not difficult at all     DREW-7  3/11/2021   1. Feeling nervous, anxious, or on edge 0   2. Not being able to stop or control worrying 0   3. Worrying too much about different things 0   4. Trouble relaxing 0   5.  Being so restless that it is hard to sit still 0   6. Becoming easily annoyed or irritable 0   7. Feeling afraid, as if something awful might happen 0   DREW-7 Total Score 0   If you checked any problems, how difficult have they made it for you to do your work, take care of things at home, or get along with other people? Not difficult at all       Suicide Assessment Five-step Evaluation and Treatment (SAFE-T)      How many servings of fruits and vegetables do you eat daily?  2-3    On average, how many sweetened beverages do you drink each day (Examples: soda, juice, sweet tea, etc.  Do NOT count diet or artificially sweetened beverages)?   0-1 black coffee    How many days per week do you exercise enough to make your heart beat faster? 3 or less    How many minutes a day do you exercise enough to make your heart beat faster? 9 or less    How many days per week do you miss taking your medication? 0      She c/o GERD, symptoms are worsening.  She has had GERD for year and it continues to worsen.  No previous EGD.   Taking pepcid 20 mg 3 times per day currently.     She has had acid come up into the mouth.  No blood or black in the stool.  Lot's of belching.    She feels a lump in her throat at times.   She did do a 14 day course of omeprazole a month ago, with slight improvement but now acid is worsening.      She does not use NSAIDs.       She c/o pain in right ear and pressure in right cheek a couple weeks.  Ear pain is worse with moving jaw in certain positions.  Sinus pressure.   She does have flonase and saline nasal sprays.          Review of Systems   Constitutional, HEENT, cardiovascular, pulmonary, GI, , musculoskeletal, neuro, skin, endocrine and psych systems are negative, except as otherwise noted.      Objective           Vitals:  No vitals were obtained today due to virtual visit.    Physical Exam   healthy, alert and no distress  PSYCH: Alert and oriented times 3; coherent speech, normal   rate and  volume, able to articulate logical thoughts, able   to abstract reason, no tangential thoughts, no hallucinations   or delusions  Her affect is normal  RESP: No cough, no audible wheezing, able to talk in full sentences  Remainder of exam unable to be completed due to telephone visits                Phone call duration: 16 minutes

## 2021-03-11 ENCOUNTER — VIRTUAL VISIT (OUTPATIENT)
Dept: FAMILY MEDICINE | Facility: CLINIC | Age: 61
End: 2021-03-11
Payer: COMMERCIAL

## 2021-03-11 DIAGNOSIS — F32.1 MODERATE MAJOR DEPRESSION (H): Primary | ICD-10-CM

## 2021-03-11 DIAGNOSIS — J01.90 ACUTE SINUSITIS WITH SYMPTOMS > 10 DAYS: ICD-10-CM

## 2021-03-11 DIAGNOSIS — Z12.11 COLON CANCER SCREENING: ICD-10-CM

## 2021-03-11 DIAGNOSIS — K21.00 GASTROESOPHAGEAL REFLUX DISEASE WITH ESOPHAGITIS WITHOUT HEMORRHAGE: ICD-10-CM

## 2021-03-11 PROCEDURE — 99214 OFFICE O/P EST MOD 30 MIN: CPT | Mod: 95 | Performed by: PHYSICIAN ASSISTANT

## 2021-03-11 PROCEDURE — 96127 BRIEF EMOTIONAL/BEHAV ASSMT: CPT | Mod: 59 | Performed by: PHYSICIAN ASSISTANT

## 2021-03-11 RX ORDER — BUPROPION HYDROCHLORIDE 150 MG/1
150 TABLET ORAL EVERY MORNING
Qty: 90 TABLET | Refills: 3 | Status: SHIPPED | OUTPATIENT
Start: 2021-03-11 | End: 2022-01-13

## 2021-03-11 RX ORDER — FLUOXETINE 40 MG/1
40 CAPSULE ORAL DAILY
Qty: 90 CAPSULE | Refills: 3 | Status: SHIPPED | OUTPATIENT
Start: 2021-03-11 | End: 2022-01-13

## 2021-03-11 ASSESSMENT — ANXIETY QUESTIONNAIRES
GAD7 TOTAL SCORE: 0
3. WORRYING TOO MUCH ABOUT DIFFERENT THINGS: NOT AT ALL
IF YOU CHECKED OFF ANY PROBLEMS ON THIS QUESTIONNAIRE, HOW DIFFICULT HAVE THESE PROBLEMS MADE IT FOR YOU TO DO YOUR WORK, TAKE CARE OF THINGS AT HOME, OR GET ALONG WITH OTHER PEOPLE: NOT DIFFICULT AT ALL
7. FEELING AFRAID AS IF SOMETHING AWFUL MIGHT HAPPEN: NOT AT ALL
2. NOT BEING ABLE TO STOP OR CONTROL WORRYING: NOT AT ALL
6. BECOMING EASILY ANNOYED OR IRRITABLE: NOT AT ALL
5. BEING SO RESTLESS THAT IT IS HARD TO SIT STILL: NOT AT ALL
1. FEELING NERVOUS, ANXIOUS, OR ON EDGE: NOT AT ALL

## 2021-03-11 ASSESSMENT — PATIENT HEALTH QUESTIONNAIRE - PHQ9
SUM OF ALL RESPONSES TO PHQ QUESTIONS 1-9: 3
5. POOR APPETITE OR OVEREATING: NOT AT ALL

## 2021-03-11 NOTE — PATIENT INSTRUCTIONS
If you have not heard from the scheduling office within 2 business days, please call [phone] to set up the EGD and colonoscopy.

## 2021-03-12 ASSESSMENT — ANXIETY QUESTIONNAIRES: GAD7 TOTAL SCORE: 0

## 2021-04-08 DIAGNOSIS — Z11.59 ENCOUNTER FOR SCREENING FOR OTHER VIRAL DISEASES: ICD-10-CM

## 2021-04-11 ENCOUNTER — HEALTH MAINTENANCE LETTER (OUTPATIENT)
Age: 61
End: 2021-04-11

## 2021-04-20 ENCOUNTER — MYC MEDICAL ADVICE (OUTPATIENT)
Dept: FAMILY MEDICINE | Facility: CLINIC | Age: 61
End: 2021-04-20

## 2021-04-20 ENCOUNTER — ANESTHESIA EVENT (OUTPATIENT)
Dept: GASTROENTEROLOGY | Facility: CLINIC | Age: 61
End: 2021-04-20
Payer: COMMERCIAL

## 2021-04-20 DIAGNOSIS — Z11.59 ENCOUNTER FOR SCREENING FOR OTHER VIRAL DISEASES: ICD-10-CM

## 2021-04-20 LAB
LABORATORY COMMENT REPORT: NORMAL
SARS-COV-2 RNA RESP QL NAA+PROBE: NEGATIVE
SARS-COV-2 RNA RESP QL NAA+PROBE: NORMAL
SPECIMEN SOURCE: NORMAL
SPECIMEN SOURCE: NORMAL

## 2021-04-20 PROCEDURE — U0003 INFECTIOUS AGENT DETECTION BY NUCLEIC ACID (DNA OR RNA); SEVERE ACUTE RESPIRATORY SYNDROME CORONAVIRUS 2 (SARS-COV-2) (CORONAVIRUS DISEASE [COVID-19]), AMPLIFIED PROBE TECHNIQUE, MAKING USE OF HIGH THROUGHPUT TECHNOLOGIES AS DESCRIBED BY CMS-2020-01-R: HCPCS | Performed by: SURGERY

## 2021-04-20 PROCEDURE — U0005 INFEC AGEN DETEC AMPLI PROBE: HCPCS | Performed by: SURGERY

## 2021-04-20 NOTE — ANESTHESIA PREPROCEDURE EVALUATION
Anesthesia Pre-Procedure Evaluation    Patient: Taisha Yanez   MRN: [id number] : 1960        Preoperative Diagnosis: Gastroesophageal reflux disease with esophagitis without hemorrhage [K21.00]  Colon cancer screening [Z12.11]   Procedure : Procedure(s):  COLONOSCOPY  ESOPHAGOGASTRODUODENOSCOPY (EGD)     No past medical history on file.   Past Surgical History:   Procedure Laterality Date     HYSTERECTOMY, PAP NO LONGER INDICATED        Allergies   Allergen Reactions     Codeine      Morphine Itching      Social History     Tobacco Use     Smoking status: Never Smoker     Smokeless tobacco: Never Used   Substance Use Topics     Alcohol use: Yes      Wt Readings from Last 1 Encounters:   17 68 kg (150 lb)        Anesthesia Evaluation   Pt has had prior anesthetic. Type: General and MAC.        ROS/MED HX  ENT/Pulmonary:  - neg pulmonary ROS     Neurologic:  - neg neurologic ROS     Cardiovascular:  - neg cardiovascular ROS     METS/Exercise Tolerance: >4 METS    Hematologic:  - neg hematologic  ROS     Musculoskeletal:  - neg musculoskeletal ROS     GI/Hepatic:     (+) GERD,     Renal/Genitourinary:  - neg Renal ROS     Endo:  - neg endo ROS     Psychiatric/Substance Use:     (+) psychiatric history depression and anxiety     Infectious Disease:  - neg infectious disease ROS     Malignancy:  - neg malignancy ROS     Other:  - neg other ROS          Physical Exam    Airway        Mallampati: II   TM distance: > 3 FB   Neck ROM: full   Mouth opening: > 3 cm    Respiratory Devices and Support         Dental  no notable dental history         Cardiovascular   cardiovascular exam normal       Rhythm and rate: regular and normal     Pulmonary   pulmonary exam normal        breath sounds clear to auscultation           OUTSIDE LABS:  CBC: No results found for: WBC, HGB, HCT, PLT  BMP:   Lab Results   Component Value Date    GLC 84 2020     COAGS: No results found for: PTT, INR, FIBR  POC: No results  found for: BGM, HCG, HCGS  HEPATIC: No results found for: ALBUMIN, PROTTOTAL, ALT, AST, GGT, ALKPHOS, BILITOTAL, BILIDIRECT, LATA  OTHER: No results found for: PH, LACT, A1C, JUDIT, PHOS, MAG, LIPASE, AMYLASE, TSH, T4, T3, CRP, SED    Anesthesia Plan    ASA Status:  2   NPO Status:  NPO Appropriate    Anesthesia Type: General.   Induction: Propofol.   Maintenance: TIVA.        Consents    Anesthesia Plan(s) and associated risks, benefits, and realistic alternatives discussed. Questions answered and patient/representative(s) expressed understanding.     - Discussed with:  Patient         Postoperative Care    Pain management: Oral pain medications.        Comments:                MANJIT Stanford CRNA

## 2021-04-21 NOTE — PROGRESS NOTES
Patient had not received bowel prep information,ate granola and orange for breakfast. Contacted kitty Stovall to do bowel prep and have procedure, prep info e- mailed to patient

## 2021-04-22 ENCOUNTER — ANESTHESIA (OUTPATIENT)
Dept: GASTROENTEROLOGY | Facility: CLINIC | Age: 61
End: 2021-04-22
Payer: COMMERCIAL

## 2021-04-22 ENCOUNTER — HOSPITAL ENCOUNTER (OUTPATIENT)
Facility: CLINIC | Age: 61
Discharge: HOME | End: 2021-04-22
Attending: SURGERY | Admitting: SURGERY
Payer: COMMERCIAL

## 2021-04-22 VITALS
HEART RATE: 83 BPM | BODY MASS INDEX: 26.66 KG/M2 | OXYGEN SATURATION: 97 % | DIASTOLIC BLOOD PRESSURE: 70 MMHG | TEMPERATURE: 98.9 F | WEIGHT: 160 LBS | HEIGHT: 65 IN | SYSTOLIC BLOOD PRESSURE: 102 MMHG | RESPIRATION RATE: 14 BRPM

## 2021-04-22 PROBLEM — K44.9 HIATAL HERNIA: Status: ACTIVE | Noted: 2021-04-22

## 2021-04-22 LAB
COLONOSCOPY: NORMAL
UPPER GI ENDOSCOPY: NORMAL

## 2021-04-22 PROCEDURE — 250N000009 HC RX 250: Performed by: NURSE ANESTHETIST, CERTIFIED REGISTERED

## 2021-04-22 PROCEDURE — G0121 COLON CA SCRN NOT HI RSK IND: HCPCS | Performed by: SURGERY

## 2021-04-22 PROCEDURE — 250N000009 HC RX 250: Performed by: SURGERY

## 2021-04-22 PROCEDURE — 250N000011 HC RX IP 250 OP 636: Performed by: NURSE ANESTHETIST, CERTIFIED REGISTERED

## 2021-04-22 PROCEDURE — 258N000003 HC RX IP 258 OP 636: Performed by: SURGERY

## 2021-04-22 PROCEDURE — 43235 EGD DIAGNOSTIC BRUSH WASH: CPT | Mod: 51 | Performed by: SURGERY

## 2021-04-22 PROCEDURE — 45378 DIAGNOSTIC COLONOSCOPY: CPT | Performed by: SURGERY

## 2021-04-22 PROCEDURE — 370N000017 HC ANESTHESIA TECHNICAL FEE, PER MIN: Performed by: SURGERY

## 2021-04-22 PROCEDURE — 258N000003 HC RX IP 258 OP 636: Performed by: NURSE ANESTHETIST, CERTIFIED REGISTERED

## 2021-04-22 PROCEDURE — 43235 EGD DIAGNOSTIC BRUSH WASH: CPT | Performed by: SURGERY

## 2021-04-22 RX ORDER — LIDOCAINE HYDROCHLORIDE 10 MG/ML
INJECTION, SOLUTION EPIDURAL; INFILTRATION; INTRACAUDAL; PERINEURAL PRN
Status: DISCONTINUED | OUTPATIENT
Start: 2021-04-22 | End: 2021-04-22

## 2021-04-22 RX ORDER — PROPOFOL 10 MG/ML
INJECTION, EMULSION INTRAVENOUS CONTINUOUS PRN
Status: DISCONTINUED | OUTPATIENT
Start: 2021-04-22 | End: 2021-04-22

## 2021-04-22 RX ORDER — PROPOFOL 10 MG/ML
INJECTION, EMULSION INTRAVENOUS PRN
Status: DISCONTINUED | OUTPATIENT
Start: 2021-04-22 | End: 2021-04-22

## 2021-04-22 RX ORDER — ONDANSETRON 2 MG/ML
4 INJECTION INTRAMUSCULAR; INTRAVENOUS
Status: DISCONTINUED | OUTPATIENT
Start: 2021-04-22 | End: 2021-04-22 | Stop reason: HOSPADM

## 2021-04-22 RX ORDER — LIDOCAINE 40 MG/G
CREAM TOPICAL
Status: DISCONTINUED | OUTPATIENT
Start: 2021-04-22 | End: 2021-04-22 | Stop reason: HOSPADM

## 2021-04-22 RX ORDER — SODIUM CHLORIDE, SODIUM LACTATE, POTASSIUM CHLORIDE, CALCIUM CHLORIDE 600; 310; 30; 20 MG/100ML; MG/100ML; MG/100ML; MG/100ML
INJECTION, SOLUTION INTRAVENOUS CONTINUOUS
Status: DISCONTINUED | OUTPATIENT
Start: 2021-04-22 | End: 2021-04-22 | Stop reason: HOSPADM

## 2021-04-22 RX ADMIN — PROPOFOL 150 MCG/KG/MIN: 10 INJECTION, EMULSION INTRAVENOUS at 09:11

## 2021-04-22 RX ADMIN — LIDOCAINE HYDROCHLORIDE 0.1 ML: 10 INJECTION, SOLUTION EPIDURAL; INFILTRATION; INTRACAUDAL; PERINEURAL at 08:33

## 2021-04-22 RX ADMIN — SODIUM CHLORIDE, POTASSIUM CHLORIDE, SODIUM LACTATE AND CALCIUM CHLORIDE: 600; 310; 30; 20 INJECTION, SOLUTION INTRAVENOUS at 08:32

## 2021-04-22 RX ADMIN — LIDOCAINE HYDROCHLORIDE 40 MG: 10 INJECTION, SOLUTION EPIDURAL; INFILTRATION; INTRACAUDAL; PERINEURAL at 09:11

## 2021-04-22 RX ADMIN — TOPICAL ANESTHETIC 1 SPRAY: 200 SPRAY DENTAL; PERIODONTAL at 09:05

## 2021-04-22 RX ADMIN — TOPICAL ANESTHETIC 1 SPRAY: 200 SPRAY DENTAL; PERIODONTAL at 09:10

## 2021-04-22 RX ADMIN — PROPOFOL 100 MG: 10 INJECTION, EMULSION INTRAVENOUS at 09:11

## 2021-04-22 RX ADMIN — SODIUM CHLORIDE, POTASSIUM CHLORIDE, SODIUM LACTATE AND CALCIUM CHLORIDE: 600; 310; 30; 20 INJECTION, SOLUTION INTRAVENOUS at 09:08

## 2021-04-22 ASSESSMENT — MIFFLIN-ST. JEOR: SCORE: 1301.64

## 2021-04-22 NOTE — OP NOTE
EGD and Colonoscopy - small hiatal hernia. No inflammation or ulceration.  Colon normal without masses, polyps, or diverticula.

## 2021-04-22 NOTE — ANESTHESIA POSTPROCEDURE EVALUATION
Patient: Taisha Yanez    Procedure(s):  COLONOSCOPY  ESOPHAGOGASTRODUODENOSCOPY (EGD)    Diagnosis:Gastroesophageal reflux disease with esophagitis without hemorrhage [K21.00]  Colon cancer screening [Z12.11]  Diagnosis Additional Information: No value filed.    Anesthesia Type:  General    Note:  Disposition: Outpatient   Postop Pain Control: Uneventful            Sign Out: Well controlled pain   PONV: No   Neuro/Psych: Uneventful            Sign Out: Acceptable/Baseline neuro status   Airway/Respiratory: Uneventful            Sign Out: Acceptable/Baseline resp. status   CV/Hemodynamics: Uneventful            Sign Out: Acceptable CV status; No obvious hypovolemia; No obvious fluid overload   Other NRE: NONE   DID A NON-ROUTINE EVENT OCCUR? No           Last vitals:  Vitals:    04/22/21 0800 04/22/21 0940   BP: 101/88 (!) 89/56   Pulse: 97 77   Resp: 16 12   Temp: 37.2  C (98.9  F)    SpO2: 96% 94%       Last vitals prior to Anesthesia Care Transfer:  CRNA VITALS  4/22/2021 0906 - 4/22/2021 0951      4/22/2021             Pulse:  76    Ht Rate:  76    SpO2:  95 %          Electronically Signed By: Jeffry Pinon CRNA, APRN CRNA  April 22, 2021  9:51 AM

## 2021-04-22 NOTE — ANESTHESIA CARE TRANSFER NOTE
Patient: Taisha Yanez    Procedure(s):  COLONOSCOPY  ESOPHAGOGASTRODUODENOSCOPY (EGD)    Diagnosis: Gastroesophageal reflux disease with esophagitis without hemorrhage [K21.00]  Colon cancer screening [Z12.11]  Diagnosis Additional Information: No value filed.    Anesthesia Type:   General     Note:    Oropharynx: oropharynx clear of all foreign objects and spontaneously breathing  Level of Consciousness: awake  Oxygen Supplementation: nasal cannula  Level of Supplemental Oxygen (L/min / FiO2): 2  Independent Airway: airway patency satisfactory and stable  Dentition: dentition unchanged  Vital Signs Stable: post-procedure vital signs reviewed and stable  Report to RN Given: handoff report given  Patient transferred to: Phase II    Handoff Report: Identifed the Patient, Identified the Reponsible Provider, Reviewed the pertinent medical history, Discussed the surgical course, Reviewed Intra-OP anesthesia mangement and issues during anesthesia, Set expectations for post-procedure period and Allowed opportunity for questions and acknowledgement of understanding      Vitals: (Last set prior to Anesthesia Care Transfer)  CRNA VITALS  4/22/2021 0906 - 4/22/2021 0936      4/22/2021             Pulse:  76    Ht Rate:  76    SpO2:  95 %        Electronically Signed By: Jeffry Pinon CRNA, APRN CRNA  April 22, 2021  9:36 AM

## 2021-04-22 NOTE — H&P
60 year old year old female here for upper and lower endoscopy for reflux and screening.        Patient Active Problem List   Diagnosis     Cellulitis of foot, right     TMJ (temporomandibular joint syndrome)     Moderate major depression (H)     Anxiety     Chronic sinusitis, unspecified location     Atrophic vaginitis     Gastroesophageal reflux disease with esophagitis     IBS (irritable bowel syndrome)     Seasonal allergies     Primary osteoarthritis of left knee     Abdominal spasms     Family history of breast cancer in first degree relative       No past medical history on file.    Past Surgical History:   Procedure Laterality Date     HYSTERECTOMY, PAP NO LONGER INDICATED         Family History   Problem Relation Age of Onset     Eye Disorder Father         macular degeneration     Breast Cancer Mother 82       No current outpatient medications on file.       Allergies   Allergen Reactions     Codeine      Morphine Itching       Pt reports that she has never smoked. She has never used smokeless tobacco. She reports current alcohol use. She reports that she does not use drugs.    Exam:    Awake, Alert OX3  Lungs - CTA bilaterally  CV - RRR, no murmurs, distal pulses intact  Abd - soft, non-distended, non-tender, +BS  Extr - No cyanosis or edema    A/P 60 year old year old female in need of upper and lower endoscopy for reflux and screening. Risks, benefits, alternatives, and complications were discussed including the possibility of perforation and the patient agreed to proceed.    Guilherme Hurtado MD

## 2021-04-23 NOTE — ADDENDUM NOTE
Addendum  created 04/23/21 1702 by Zina Butcher APRN CRNA    Intraprocedure Event edited, Intraprocedure Staff edited

## 2021-04-27 DIAGNOSIS — Z12.31 VISIT FOR SCREENING MAMMOGRAM: ICD-10-CM

## 2021-04-27 PROCEDURE — 77063 BREAST TOMOSYNTHESIS BI: CPT | Mod: TC | Performed by: RADIOLOGY

## 2021-04-27 PROCEDURE — 77067 SCR MAMMO BI INCL CAD: CPT | Mod: TC | Performed by: RADIOLOGY

## 2021-09-26 ENCOUNTER — HEALTH MAINTENANCE LETTER (OUTPATIENT)
Age: 61
End: 2021-09-26

## 2021-10-04 ENCOUNTER — E-VISIT (OUTPATIENT)
Dept: FAMILY MEDICINE | Facility: CLINIC | Age: 61
End: 2021-10-04
Payer: COMMERCIAL

## 2021-10-04 DIAGNOSIS — J01.90 ACUTE SINUSITIS WITH SYMPTOMS > 10 DAYS: Primary | ICD-10-CM

## 2021-10-04 PROCEDURE — 99421 OL DIG E/M SVC 5-10 MIN: CPT | Performed by: PHYSICIAN ASSISTANT

## 2021-10-04 NOTE — PATIENT INSTRUCTIONS
Dear Taisha Yanez    After reviewing your responses, I've been able to diagnose you with?a sinus infection caused by bacteria.?     Based on your responses and diagnosis, I have prescribed an antibiotic (Augmentin) to treat your symptoms. I have sent this to your pharmacy.?     It is also important to stay well hydrated, get lots of rest and take over-the-counter decongestants,?tylenol?or ibuprofen if you?are able to?take those medications per your primary care provider to help relieve discomfort.?     It is important that you take?all of?your prescribed medication even if your symptoms are improving after a few doses.? Taking?all of?your medicine helps prevent the symptoms from returning.?     If your symptoms worsen, you develop severe headache, vomiting, high fever (>102), or are not improving in 7 days, please contact your primary care provider for an appointment or visit any of our convenient Walk-in Care or Urgent Care Centers to be seen which can be found on our website?here.?     Thanks again for choosing?us?as your health care partner,?   ?  Cathryn Hills PA-C?

## 2021-10-19 PROBLEM — F32.9 MAJOR DEPRESSION: Status: ACTIVE | Noted: 2017-10-13

## 2021-10-27 ENCOUNTER — MYC MEDICAL ADVICE (OUTPATIENT)
Dept: FAMILY MEDICINE | Facility: CLINIC | Age: 61
End: 2021-10-27

## 2021-10-30 DIAGNOSIS — R10.9 ABDOMINAL SPASMS: ICD-10-CM

## 2021-10-30 DIAGNOSIS — M26.609 TMJ (TEMPOROMANDIBULAR JOINT SYNDROME): ICD-10-CM

## 2021-11-01 RX ORDER — CYCLOBENZAPRINE HCL 10 MG
10 TABLET ORAL 2 TIMES DAILY PRN
Qty: 42 TABLET | Refills: 0 | OUTPATIENT
Start: 2021-11-01

## 2021-11-01 RX ORDER — DICYCLOMINE HCL 20 MG
TABLET ORAL
Qty: 90 TABLET | Refills: 0 | Status: SHIPPED | OUTPATIENT
Start: 2021-11-01

## 2021-11-01 NOTE — TELEPHONE ENCOUNTER
"Requested Prescriptions   Pending Prescriptions Disp Refills     dicyclomine (BENTYL) 20 MG tablet [Pharmacy Med Name: Dicyclomine HCl Oral Tablet 20 MG] 90 tablet 0     Sig: TAKE ONE TABLET BY MOUTH THREE TIMES A DAY WITH MEALS AS NEEDED. MAY ALSO TAKE BEFORE BED       Oral Anticholinergic Agents Passed - 10/30/2021 10:51 AM        Passed - Patient is of age 12 or older        Passed - Recent (12 mo) or future (30 days) visit with authorizing provider's specialty     Patient has had an office visit with the authorizing provider or a provider within the authorizing providers department within the previous 12 mos or has a future within next 30 days. See \"Patient Info\" tab in inbasket, or \"Choose Columns\" in Meds & Orders section of the refill encounter.              Passed - Medication is active on med list        Passed - Patient is not pregnant        Passed - No positive pregnancy test on file within past 12 months           cyclobenzaprine (FLEXERIL) 10 MG tablet [Pharmacy Med Name: Cyclobenzaprine HCl Oral Tablet 10 MG] 42 tablet 0     Sig: Take 1 tablet (10 mg) by mouth 2 times daily as needed for muscle spasms       There is no refill protocol information for this order            Flexaril:   Drug not on [place] [facility] [place]   Medication is discontinued    Bentyl:    Routing refill request to provider for review/approval because:  A break in medication          "

## 2021-11-02 ENCOUNTER — OFFICE VISIT (OUTPATIENT)
Dept: FAMILY MEDICINE | Facility: CLINIC | Age: 61
End: 2021-11-02
Payer: COMMERCIAL

## 2021-11-02 VITALS
OXYGEN SATURATION: 97 % | HEART RATE: 96 BPM | DIASTOLIC BLOOD PRESSURE: 77 MMHG | TEMPERATURE: 97.1 F | SYSTOLIC BLOOD PRESSURE: 117 MMHG

## 2021-11-02 DIAGNOSIS — J01.90 ACUTE SINUSITIS WITH SYMPTOMS > 10 DAYS: Primary | ICD-10-CM

## 2021-11-02 PROCEDURE — 99213 OFFICE O/P EST LOW 20 MIN: CPT | Performed by: FAMILY MEDICINE

## 2021-11-02 RX ORDER — AZELASTINE 1 MG/ML
1 SPRAY, METERED NASAL 2 TIMES DAILY
Qty: 30 ML | Refills: 1 | Status: SHIPPED | OUTPATIENT
Start: 2021-11-02 | End: 2023-02-03

## 2021-11-02 RX ORDER — CETIRIZINE HYDROCHLORIDE 10 MG/1
10 TABLET ORAL DAILY
Qty: 30 TABLET | Refills: 0 | Status: SHIPPED | OUTPATIENT
Start: 2021-11-02 | End: 2023-02-03

## 2021-11-02 RX ORDER — FLUTICASONE PROPIONATE 50 MCG
1 SPRAY, SUSPENSION (ML) NASAL DAILY
Qty: 9.9 ML | Refills: 0 | Status: SHIPPED | OUTPATIENT
Start: 2021-11-02 | End: 2023-02-03

## 2021-11-02 NOTE — PROGRESS NOTES
"  Assessment & Plan     Acute sinusitis with symptoms > 10 days  discussed with her concern .  Symptoms could be related to acute sinusitis.  I recommend the following:  - azelastine (ASTELIN) 0.1 % nasal spray; Spray 1 spray into both nostrils 2 times daily  - fluticasone (FLONASE) 50 MCG/ACT nasal spray; Spray 1 spray into both nostrils daily  - cetirizine (ZYRTEC) 10 MG tablet; Take 1 tablet (10 mg) by mouth daily  - amoxicillin-clavulanate (AUGMENTIN) 875-125 MG tablet; Take 1 tablet by mouth 2 times daily for 10 days             BMI:   Estimated body mass index is 26.63 kg/m  as calculated from the following:    Height as of 4/22/21: 1.651 m (5' 5\").    Weight as of 4/22/21: 72.6 kg (160 lb).           Return in about 3 months (around 2/2/2022).    Gerry Laguerre MD  [facility]   Taisha is a 60 year old who presents for the following health issues  accompanied by her self.    HPI     Acute Illness  Acute illness concerns: recheck sinus sx  Onset/Duration: was seen in e-visit on 10/4 and Rx'd Augmentin  Symptoms:  Fever: no  Chills/Sweats: no  Headache (location?): no  Sinus Pressure: YES  Conjunctivitis:  no  Ear Pain: YES- RT side - but also clenches jaw so not sure if related to TMJ  Rhinorrhea: no  Congestion: no  Sore Throat: no  Cough: no  Wheeze: no  Post nasal dripping    Therapies tried and outcome: Augmentin 1 month ago       Review of Systems   Constitutional, HEENT, cardiovascular, pulmonary, gi and gu systems are negative, except as otherwise noted.      Objective    /77   Pulse 96   Temp 97.1  F (36.2  C) (Tympanic)   SpO2 97%   There is no height or weight on file to calculate BMI.  Physical Exam  Vitals and nursing note reviewed.   Constitutional:       General: She is not in acute distress.     Appearance: Normal appearance. She is not ill-appearing, toxic-appearing or diaphoretic.   HENT:      Head: Normocephalic and atraumatic.      Right Ear: " Tympanic membrane normal.      Left Ear: Tympanic membrane normal.      Nose: Congestion present.      Mouth/Throat:      Mouth: Mucous membranes are moist.      Pharynx: No oropharyngeal exudate or posterior oropharyngeal erythema.   Neurological:      Mental Status: She is alert.

## 2022-01-13 ENCOUNTER — VIRTUAL VISIT (OUTPATIENT)
Dept: FAMILY MEDICINE | Facility: CLINIC | Age: 62
End: 2022-01-13
Payer: COMMERCIAL

## 2022-01-13 DIAGNOSIS — F33.41 RECURRENT MAJOR DEPRESSIVE DISORDER, IN PARTIAL REMISSION (H): Primary | ICD-10-CM

## 2022-01-13 DIAGNOSIS — F32.1 MODERATE MAJOR DEPRESSION (H): ICD-10-CM

## 2022-01-13 DIAGNOSIS — K21.00 GASTROESOPHAGEAL REFLUX DISEASE WITH ESOPHAGITIS WITHOUT HEMORRHAGE: ICD-10-CM

## 2022-01-13 PROCEDURE — 99213 OFFICE O/P EST LOW 20 MIN: CPT | Mod: 95 | Performed by: PHYSICIAN ASSISTANT

## 2022-01-13 RX ORDER — FLUOXETINE 10 MG/1
20 CAPSULE ORAL DAILY
Qty: 180 CAPSULE | Refills: 0 | Status: SHIPPED | OUTPATIENT
Start: 2022-01-13 | End: 2022-01-13

## 2022-01-13 RX ORDER — BUPROPION HYDROCHLORIDE 75 MG/1
75 TABLET ORAL DAILY
Qty: 90 TABLET | Refills: 0 | Status: SHIPPED | OUTPATIENT
Start: 2022-01-13 | End: 2022-01-13

## 2022-01-13 RX ORDER — FLUOXETINE 10 MG/1
20 CAPSULE ORAL DAILY
Qty: 180 CAPSULE | Refills: 0 | Status: SHIPPED | OUTPATIENT
Start: 2022-01-13 | End: 2022-01-18

## 2022-01-13 RX ORDER — BUPROPION HYDROCHLORIDE 75 MG/1
75 TABLET ORAL DAILY
Qty: 90 TABLET | Refills: 0 | Status: SHIPPED | OUTPATIENT
Start: 2022-01-13 | End: 2022-04-26

## 2022-01-13 RX ORDER — FAMOTIDINE 20 MG/1
20 TABLET, FILM COATED ORAL DAILY
COMMUNITY
Start: 2022-01-13 | End: 2023-02-03

## 2022-01-13 ASSESSMENT — PATIENT HEALTH QUESTIONNAIRE - PHQ9: SUM OF ALL RESPONSES TO PHQ QUESTIONS 1-9: 2

## 2022-01-13 NOTE — PATIENT INSTRUCTIONS
I'm glad to hear your depression symptoms are improving.    To decrease the dose of your medications, I suggest the following:    Prozac 20 mg daily for 1-2 months then down to 10 mg daily for 1-2 months then you can stop if you'd like.     Wellbutrin 75 mg daily for 1-2 month then down to 37.5 mg daily for 1-2 months then you can stop this as well.       For your reflux disease, I suggest you take the famotidine on a daily basis and increase to twice per day dosing as needed.  Keep doing the lifestyle changes you discussed as well.

## 2022-01-13 NOTE — PROGRESS NOTES
"Taisha is a 61 year old who is being evaluated via a billable video visit.      How would you like to obtain your AVS? MyChart  If the video visit is dropped, the invitation should be resent by: Text to cell phone: [phone]  Will anyone else be joining your video visit? No    Video Start Time: 12:08 PM    Assessment & Plan     Recurrent major depressive disorder, in partial remission (H)  I'm glad to hear your depression symptoms are improving.    To decrease the dose of your medications, I suggest the following:    Prozac 20 mg daily for 1-2 months then down to 10 mg daily for 1-2 months then you can stop if you'd like.     Wellbutrin 75 mg daily for 1-2 month then down to 37.5 mg daily for 1-2 months then you can stop this as well.     - buPROPion (WELLBUTRIN) 75 MG tablet; Take 1 tablet (75 mg) by mouth daily  - FLUoxetine (PROZAC) 10 MG capsule; Take 2 capsules (20 mg) by mouth daily      GERD:  For your reflux disease, I suggest you take the famotidine on a daily basis and increase to twice per day dosing as needed.  Keep doing the lifestyle changes you discussed as well.        BMI:   Estimated body mass index is 26.63 kg/m  as calculated from the following:    Height as of 4/22/21: 1.651 m (5' 5\").    Weight as of 4/22/21: 72.6 kg (160 lb).           Return in about 6 months (around 7/13/2022) for Physical Exam.    LUCIANO [facility] MICHELLE    Nataliia Sumner is a 61 year old who presents for the following health issues     HPI     Depression Followup    How are you doing with your depression since your last visit? Improved with help of medication     Are you having other symptoms that might be associated with depression? No    Have you had a significant life event?  No     Are you feeling anxious or having panic attacks?   No    Do you have any concerns with your use of alcohol or other drugs? No    She has had more belching recently.   She is on reflux medication and no " excessive reflux.      PHQ-9 score:    PHQ 1/13/2022   PHQ-9 Total Score 2   Q9: Thoughts of better off dead/self-harm past 2 weeks Not at all     Social History     Tobacco Use     Smoking status: Never Smoker     Smokeless tobacco: Never Used   Substance Use Topics     Alcohol use: Yes     Drug use: No     PHQ 12/16/2019 3/11/2021 1/13/2022   PHQ-9 Total Score 3 3 2   Q9: Thoughts of better off dead/self-harm past 2 weeks Not at all Not at all Not at all     DREW-7 SCORE 11/23/2018 12/16/2019 3/11/2021   Total Score 0 0 0     Last PHQ-9 1/13/2022   1.  Little interest or pleasure in doing things 0   2.  Feeling down, depressed, or hopeless 0   3.  Trouble falling or staying asleep, or sleeping too much 1   4.  Feeling tired or having little energy 0   5.  Poor appetite or overeating 1   6.  Feeling bad about yourself 0   7.  Trouble concentrating 0   8.  Moving slowly or restless 0   Q9: Thoughts of better off dead/self-harm past 2 weeks 0   PHQ-9 Total Score 2   Difficulty at work, home, or with people Not difficult at all       Suicide Assessment Five-step Evaluation and Treatment (SAFE-T)      How many servings of fruits and vegetables do you eat daily?  1-2    On average, how many sweetened beverages do you drink each day (Examples: soda, juice, sweet tea, etc.  Do NOT count diet or artificially sweetened beverages)?   0    How many days per week do you exercise enough to make your heart beat faster? 3 or less    How many minutes a day do you exercise enough to make your heart beat faster? 9 or less    How many days per week do you miss taking your medication? 0        Review of Systems   Constitutional, HEENT, cardiovascular, pulmonary, gi and gu systems are negative, except as otherwise noted.      Objective           Vitals:  No vitals were obtained today due to virtual visit.    Physical Exam   GENERAL: Healthy, alert and no distress  EYES: Eyes grossly normal to inspection.  No discharge or erythema, or  obvious scleral/conjunctival abnormalities.  RESP: No audible wheeze, cough, or visible cyanosis.  No visible retractions or increased work of breathing.    SKIN: Visible skin clear. No significant rash, abnormal pigmentation or lesions.  NEURO: Cranial nerves grossly intact.  Mentation and speech appropriate for age.  PSYCH: Mentation appears normal, affect normal/bright, judgement and insight intact, normal speech and appearance well-groomed.                Video-Visit Details    Type of service:  Video Visit    Video End Time:12:21 PM    Originating Location (pt. Location): Home    Distant Location (provider location):  [facility] MICHELLE     Platform used for Video Visit: [employer]

## 2022-01-14 ENCOUNTER — TELEPHONE (OUTPATIENT)
Dept: FAMILY MEDICINE | Facility: CLINIC | Age: 62
End: 2022-01-14
Payer: COMMERCIAL

## 2022-01-14 DIAGNOSIS — F33.41 RECURRENT MAJOR DEPRESSIVE DISORDER, IN PARTIAL REMISSION (H): Primary | ICD-10-CM

## 2022-01-14 NOTE — TELEPHONE ENCOUNTER
Prior Authorization Retail Medication Request    Medication/Dose: FLUoxetine (PROZAC) 10 MG capsule  ICD code (if different than what is on RX):  F33.41  Previously Tried and Failed:   na  Rationale:  na    [facility]  Insurance ID:  [id number]      Pharmacy Information (if different than what is on RX)  Name:  [facility]  Phone:  [phone]

## 2022-01-18 NOTE — TELEPHONE ENCOUNTER
Patient's plan allows 1 capsule per day. Can therapy be changed to 20mg capsule? Pharmacy will need new Rx.

## 2022-01-18 NOTE — TELEPHONE ENCOUNTER
I see virtual visit with PCP Cathryn Hills 1/13/22.   Plan for prozac:    Recurrent major depressive disorder, in partial remission (H)  I'm glad to hear your depression symptoms are improving.    To decrease the dose of your medications, I suggest the following:     Prozac 20 mg daily for 1-2 months then down to 10 mg daily for 1-2 months then you can stop if you'd like.       See note, insurance only covers on tab daily.    Routed to PCP to address new Rx for 20 mg daily for 1-2 month supply, then will need to send another Rx for the 10 mg daily after that.    Alicia Damon RN  [facility]

## 2022-03-09 ENCOUNTER — TELEPHONE (OUTPATIENT)
Dept: FAMILY MEDICINE | Facility: CLINIC | Age: 62
End: 2022-03-09
Payer: COMMERCIAL

## 2022-03-09 DIAGNOSIS — F41.9 ANXIETY: Primary | ICD-10-CM

## 2022-03-09 DIAGNOSIS — F33.41 RECURRENT MAJOR DEPRESSIVE DISORDER, IN PARTIAL REMISSION (H): ICD-10-CM

## 2022-03-09 RX ORDER — FLUOXETINE 10 MG/1
10 CAPSULE ORAL DAILY
Qty: 30 CAPSULE | Refills: 1 | Status: SHIPPED | OUTPATIENT
Start: 2022-03-09 | End: 2022-03-09

## 2022-03-09 NOTE — TELEPHONE ENCOUNTER
Need new Rx for 20 mg caps daily and 10 mg dose as per patient tapers. Otherwise, need prior auth.

## 2022-03-09 NOTE — TELEPHONE ENCOUNTER
Pharmacy and patient confirmed that Taisha has continued to take the Fluoxetine 40 mg daily. Patient states she has 6 days remaining on the 40 mg dose.     After reviewing with Cathryn Hills PA-C, Patient will take Fluoxetine 20 mg x 30 days, followed by Fluoxetine  10 mg x 30 days and then stop.     Prescription for Fluoxetine 20 mg will be sent today and patient will call for the Fluoxetine 10 mg (when she has about a week of the 20 mg left).     Taisha given these instructions and verbalized a good understanding.     Berkley Khanna RN BSN  [facility]

## 2022-04-20 ENCOUNTER — MYC MEDICAL ADVICE (OUTPATIENT)
Dept: FAMILY MEDICINE | Facility: CLINIC | Age: 62
End: 2022-04-20
Payer: COMMERCIAL

## 2022-04-20 DIAGNOSIS — F33.41 RECURRENT MAJOR DEPRESSIVE DISORDER, IN PARTIAL REMISSION (H): ICD-10-CM

## 2022-04-26 RX ORDER — BUPROPION HYDROCHLORIDE 75 MG/1
75 TABLET ORAL DAILY
Qty: 90 TABLET | Refills: 0 | Status: SHIPPED | OUTPATIENT
Start: 2022-04-26 | End: 2023-02-03

## 2022-05-07 ENCOUNTER — HEALTH MAINTENANCE LETTER (OUTPATIENT)
Age: 62
End: 2022-05-07

## 2022-11-29 ENCOUNTER — MYC MEDICAL ADVICE (OUTPATIENT)
Dept: FAMILY MEDICINE | Facility: CLINIC | Age: 62
End: 2022-11-29

## 2022-11-29 ENCOUNTER — DOCUMENTATION ONLY (OUTPATIENT)
Dept: LAB | Facility: CLINIC | Age: 62
End: 2022-11-29

## 2022-11-29 DIAGNOSIS — Z13.1 SCREENING FOR DIABETES MELLITUS: ICD-10-CM

## 2022-11-29 DIAGNOSIS — L98.9 SKIN LESION: Primary | ICD-10-CM

## 2022-11-29 DIAGNOSIS — Z13.6 CARDIOVASCULAR SCREENING; LDL GOAL LESS THAN 160: Primary | ICD-10-CM

## 2022-11-29 DIAGNOSIS — Z13.29 SCREENING FOR THYROID DISORDER: ICD-10-CM

## 2022-11-29 NOTE — PROGRESS NOTES
Patient is coming in to lab on 12/2/22 for PV labs for appt with Cathryn Hills on 1/9/23.  She would like lipid test and thyroid tests done and anything else she is due for.  Please place orders in [facility] thanks.

## 2022-12-02 ENCOUNTER — LAB (OUTPATIENT)
Dept: LAB | Facility: CLINIC | Age: 62
End: 2022-12-02
Payer: COMMERCIAL

## 2022-12-02 DIAGNOSIS — Z13.1 SCREENING FOR DIABETES MELLITUS: ICD-10-CM

## 2022-12-02 DIAGNOSIS — Z13.29 SCREENING FOR THYROID DISORDER: ICD-10-CM

## 2022-12-02 DIAGNOSIS — Z13.6 CARDIOVASCULAR SCREENING; LDL GOAL LESS THAN 160: ICD-10-CM

## 2022-12-02 LAB
CHOLEST SERPL-MCNC: 206 MG/DL
FASTING STATUS PATIENT QL REPORTED: YES
FASTING STATUS PATIENT QL REPORTED: YES
GLUCOSE BLD-MCNC: 90 MG/DL (ref 70–99)
HDLC SERPL-MCNC: 62 MG/DL
LDLC SERPL CALC-MCNC: 133 MG/DL
NONHDLC SERPL-MCNC: 144 MG/DL
TRIGL SERPL-MCNC: 53 MG/DL
TSH SERPL DL<=0.005 MIU/L-ACNC: 1.47 MU/L (ref 0.4–4)

## 2022-12-02 PROCEDURE — 36415 COLL VENOUS BLD VENIPUNCTURE: CPT

## 2022-12-02 PROCEDURE — 82947 ASSAY GLUCOSE BLOOD QUANT: CPT

## 2022-12-02 PROCEDURE — 84443 ASSAY THYROID STIM HORMONE: CPT

## 2022-12-02 PROCEDURE — 80061 LIPID PANEL: CPT

## 2023-02-03 ENCOUNTER — MYC MEDICAL ADVICE (OUTPATIENT)
Dept: FAMILY MEDICINE | Facility: CLINIC | Age: 63
End: 2023-02-03
Payer: COMMERCIAL

## 2023-02-03 DIAGNOSIS — R05.1 ACUTE COUGH: ICD-10-CM

## 2023-02-03 DIAGNOSIS — M79.10 MYALGIA: ICD-10-CM

## 2023-02-03 DIAGNOSIS — U07.1 INFECTION DUE TO 2019 NOVEL CORONAVIRUS: ICD-10-CM

## 2023-02-03 NOTE — TELEPHONE ENCOUNTER
RN COVID TREATMENT VISIT  02/03/23    Taisha Yanez  62 year old  Current weight? 160lb     Has the patient been seen by a primary care provider at an [facility] or [facility] within the past two years? Yes.   Have you been in close proximity to/do you have a known exposure to a person with a confirmed case of influenza? No.     Date of positive COVID test (PCR or at home)?  2/2/2023    Current COVID symptoms: headache, new loss of taste or smell and congestion or runny nose    Date COVID symptoms began: 1/29/2023    Do you have any of the following conditions that place you at risk of being very sick from COVID-19? overweight (BMI>25)    Is patient eligible to continue? Yes, established patient, 12 years or older weighing at least 88.2 lbs, who has COVID symptoms that started in the past 5 days and is at risk for being very sick from COVID-19.       Have you received monoclonal antibodies or oral antiviral medications since testing positive to COVID-19? No    Are you currently hospitalized for COVID-19? No    Do you have a history of hepatitis? No    Are you currently pregnant or nursing? No    Do you have a clinically significant hypersensitivity to nirmatrelvir, ritonavir, or molnupiravir? No    Do you have any history of severe renal impairment (eGFR < 30mL/min)? No    Do you have any history of hepatic impairment or abnormalities (e.g. hepatic panel, ALT, AST, ALK Phos, bilirubin)? No    Have you had a coronary stent placed in the previous 6 months? No    Is patient eligible to continue?   Yes, patient meets all eligibility requirements for the RN COVID treatment (as denoted by all no responses above).     Current Outpatient Medications   Medication Sig Dispense Refill     azelastine (ASTELIN) 0.1 % nasal spray Spray 1 spray into both nostrils 2 times daily 30 mL 1     buPROPion (WELLBUTRIN) 75 MG tablet Take 1 tablet (75 mg) by mouth daily 90 tablet 0     cetirizine (ZYRTEC) 10 MG  tablet Take 1 tablet (10 mg) by mouth daily 30 tablet 0     dicyclomine (BENTYL) 20 MG tablet TAKE ONE TABLET BY MOUTH THREE TIMES A DAY WITH MEALS AS NEEDED. MAY ALSO TAKE BEFORE BED 90 tablet 0     famotidine (PEPCID) 20 MG tablet Take 1 tablet (20 mg) by mouth daily       fluticasone (FLONASE) 50 MCG/ACT nasal spray Spray 1 spray into both nostrils daily 9.9 mL 0     fluticasone (FLONASE) 50 MCG/ACT spray 2 sprays         Medications from List 1 of the standing order (on medications that exclude the use of Paxlovid) that patient is taking: NONE. Is patient taking Balta's Wort? No  Is patient taking Balta's Wort or any meds from List 1? No.   Medications from List 2 of the standing order (on meds that provider needs to adjust) that patient is taking: NONE. Is patient on any of the meds from List 2? No.   Medications from List 3 of standing order (on meds that a RN needs to adjust) that patient is taking: NONE. Is patient on any meds from List 3? No.   In order of efficacy, Paxlovid has an approximate 90% reduction in hospitalization. Paxlovid can possibly cause altered sense of taste, diarrhea (loose, watery stools), high blood pressure, muscle aches.  The other option is molnupiravir which has an approximate 30% reduction in hospitalization. Molnupirarivr can possibly cause diarrhea (loose, watery stools), nausea (feeling sick to your stomach), dizziness, headaches.    Which treatment option does the patient prefer?   Paxlovid.   No results found for: GFRESTIMATED    Was last eGFR reduced? No, eGFR 60 or greater/ No Result on record. Patient can receive the normal renal function dose. Paxlovid Rx sent to [facility]   [facility] [phone]  [address], [address]  [place] [address]    Hours:  Mon-Fri: 7:00a - 7:00p    Drive-thru services available.    Temporary change to home medications: None    All medication adjustments (holds, etc) were discussed with the patient and patient was  asked to repeat back (teachback) their med adjustment.  Did patient understand med adjustment? No medication adjustments needed.         Reviewed the following instructions with the patient:    Paxlovid (nimatrelvir and ritonavir)    How it works  Two medicines (nirmatrelvir and ritonavir) are taken together. They stop the virus from growing. Less amount of virus is easier for your body to fight.    How to take    Medicine comes in a daily container with both medicine tablets. Take by mouth twice daily (once in the morning, once at night) for 5 days.    The number of tablets to take varies by patient.    Don't chew or break capsules. Swallow whole.    When to take  Take as soon as possible after positive COVID-19 test result, and within 5 days of your first symptoms.    Possible side effects  Can cause altered sense of taste, diarrhea (loose, watery stools), high blood pressure, muscle aches.    Norma Justice RN    Pt is not currently taking any meds and huddled with providers and we are all in agreement today is day 5 for this pt.

## 2023-02-03 NOTE — TELEPHONE ENCOUNTER
Called and spoke with pt about Paxlovid and she is going to talk it over with her  and then let us know if she wants it,     RN reviewed red flag symptoms with pt and when to go to ED     Also reviewed the Paxlovid must be started today since she is on day 5 of the symptoms.     PT verbalized her understanding and denies further questions.     Norma Justice RN  [facility]

## 2023-04-03 ENCOUNTER — OFFICE VISIT (OUTPATIENT)
Dept: DERMATOLOGY | Facility: CLINIC | Age: 63
End: 2023-04-03
Attending: PHYSICIAN ASSISTANT
Payer: COMMERCIAL

## 2023-04-03 DIAGNOSIS — D18.01 ANGIOMA OF SKIN: ICD-10-CM

## 2023-04-03 DIAGNOSIS — D23.9 DERMAL NEVUS: ICD-10-CM

## 2023-04-03 DIAGNOSIS — L82.1 SEBORRHEIC KERATOSIS: ICD-10-CM

## 2023-04-03 DIAGNOSIS — L98.9 SKIN LESION: ICD-10-CM

## 2023-04-03 DIAGNOSIS — L81.4 LENTIGO: ICD-10-CM

## 2023-04-03 DIAGNOSIS — L81.6 POIKILODERMA: ICD-10-CM

## 2023-04-03 DIAGNOSIS — L91.8 SKIN TAG: Primary | ICD-10-CM

## 2023-04-03 PROCEDURE — 11200 RMVL SKIN TAGS UP TO&INC 15: CPT | Performed by: DERMATOLOGY

## 2023-04-03 PROCEDURE — 99243 OFF/OP CNSLTJ NEW/EST LOW 30: CPT | Mod: 25 | Performed by: DERMATOLOGY

## 2023-04-03 ASSESSMENT — PAIN SCALES - GENERAL: PAINLEVEL: NO PAIN (0)

## 2023-04-03 NOTE — NURSING NOTE
Chief Complaint   Patient presents with     Derm Problem     Skin tags on face       There were no vitals filed for this visit.  Wt Readings from Last 1 Encounters:   04/22/21 72.6 kg (160 lb)       Zuly Hinds LPN .................4/3/2023

## 2023-04-03 NOTE — LETTER
4/3/2023         RE: Taisha Yanez  [address]  [facility] [address]        Dear Colleague,    Thank you for referring your patient, Taisha Yanez, to the [facility]. Please see a copy of my visit note below.    Taisha Yanez , a 62 year old year old female patient, I was asked to see by [place] for spots on face and chest.  Patient states this has been present for a while.  Patient reports the following symptoms:  Itching on right orbit.   .  Patient reports the following previous treatments none.  Patient reports the following modifying factors none.  Associated symptoms: none.  Patient has no other skin complaints today.  Remainder of the HPI, Meds, PMH, Allergies, FH, and SH was reviewed in chart.      Past Medical History:   Diagnosis Date     PONV (postoperative nausea and vomiting)        Past Surgical History:   Procedure Laterality Date     COLONOSCOPY N/A 4/22/2021    Procedure: COLONOSCOPY;  Surgeon: Guilherme Hurtado MD;  Location: [facility]     ESOPHAGOSCOPY, GASTROSCOPY, DUODENOSCOPY (EGD), COMBINED N/A 4/22/2021    Procedure: ESOPHAGOGASTRODUODENOSCOPY (EGD);  Surgeon: Guilherme Hurtado MD;  Location: [facility]     HYSTERECTOMY, PAP NO LONGER INDICATED          Family History   Problem Relation Age of Onset     Breast Cancer Mother 82     Eye Disorder Father         macular degeneration     Skin Cancer No family hx of        Social History     Socioeconomic History     Marital status:      Spouse name: Not on file     Number of children: Not on file     Years of education: Not on file     Highest education level: Not on file   Occupational History     Not on file   Tobacco Use     Smoking status: Never     Smokeless tobacco: Never   Vaping Use     Vaping status: Not on file   Substance and Sexual Activity     Alcohol use: Yes     Drug use: No     Sexual activity: Yes     Partners: Male     Birth control/protection: None   Other Topics Concern     Parent/sibling w/ CABG,  MI or angioplasty before 65F 55M? Not Asked   Social History Narrative     Not on file     Social Determinants of Health     Financial Resource Strain: Not on file   Food Insecurity: Not on file   Transportation Needs: Not on file   Physical Activity: Not on file   Stress: Not on file   Social Connections: Not on file   Intimate Partner Violence: Not on file   Housing Stability: Not on file       Outpatient Encounter Medications as of 4/3/2023   Medication Sig Dispense Refill     dicyclomine (BENTYL) 20 MG tablet TAKE ONE TABLET BY MOUTH THREE TIMES A DAY WITH MEALS AS NEEDED. MAY ALSO TAKE BEFORE BED 90 tablet 0     No facility-administered encounter medications on file as of 4/3/2023.             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 Seborrheic keratosis, lentigo, angioma, dermal nevus  2. Poikiloderma  Laser discussed with patient   3. Orbit skin tags x3  TANGENTIAL EXCISION:  After consent, anesthesia with LEC and prep, tangential excision performed.  No complications and routine wound care.    It was a pleasure speaking to  Taisha Yanez today.  Previous clinic  notes and pertinent laboratory tests were reviewed prior to Taisha Yanez's visit.  Nature of benign skin lesions dicussed with patient.  Signs and Symptoms of skin cancer discussed with patient.  Patient encouraged to perform monthly skin exams.  UV precautions reviewed with patient.  Risks of non-melanoma skin cancer discussed with patient   Return to clinic 12 months        Again, thank you for allowing me to participate in the care of your patient.        Sincerely,        Dylan Mcginnis MD

## 2023-06-02 ENCOUNTER — HEALTH MAINTENANCE LETTER (OUTPATIENT)
Age: 63
End: 2023-06-02

## 2023-07-15 ENCOUNTER — HEALTH MAINTENANCE LETTER (OUTPATIENT)
Age: 63
End: 2023-07-15

## 2023-09-22 ENCOUNTER — MYC MEDICAL ADVICE (OUTPATIENT)
Dept: FAMILY MEDICINE | Facility: CLINIC | Age: 63
End: 2023-09-22
Payer: COMMERCIAL

## 2023-10-26 ENCOUNTER — VIRTUAL VISIT (OUTPATIENT)
Dept: FAMILY MEDICINE | Facility: CLINIC | Age: 63
End: 2023-10-26
Payer: COMMERCIAL

## 2023-10-26 DIAGNOSIS — M16.32 OSTEOARTHRITIS RESULTING FROM LEFT HIP DYSPLASIA: ICD-10-CM

## 2023-10-26 DIAGNOSIS — Z83.49 FAMILY HISTORY OF THYROID DISEASE: ICD-10-CM

## 2023-10-26 DIAGNOSIS — Z13.6 CARDIOVASCULAR SCREENING; LDL GOAL LESS THAN 160: ICD-10-CM

## 2023-10-26 DIAGNOSIS — J00 CORYZA: ICD-10-CM

## 2023-10-26 DIAGNOSIS — Z87.898 H/O MOTION SICKNESS: Primary | ICD-10-CM

## 2023-10-26 DIAGNOSIS — R19.8 ALTERED BOWEL FUNCTION: ICD-10-CM

## 2023-10-26 PROCEDURE — 99214 OFFICE O/P EST MOD 30 MIN: CPT | Mod: VID | Performed by: PHYSICIAN ASSISTANT

## 2023-10-26 RX ORDER — SCOLOPAMINE TRANSDERMAL SYSTEM 1 MG/1
1 PATCH, EXTENDED RELEASE TRANSDERMAL
Qty: 4 PATCH | Refills: 0 | Status: SHIPPED | OUTPATIENT
Start: 2023-10-26

## 2023-10-26 ASSESSMENT — PATIENT HEALTH QUESTIONNAIRE - PHQ9
SUM OF ALL RESPONSES TO PHQ QUESTIONS 1-9: 4
SUM OF ALL RESPONSES TO PHQ QUESTIONS 1-9: 4
10. IF YOU CHECKED OFF ANY PROBLEMS, HOW DIFFICULT HAVE THESE PROBLEMS MADE IT FOR YOU TO DO YOUR WORK, TAKE CARE OF THINGS AT HOME, OR GET ALONG WITH OTHER PEOPLE: NOT DIFFICULT AT ALL

## 2023-10-26 NOTE — PROGRESS NOTES
Taisha is a 62 year old who is being evaluated via a billable video visit.      How would you like to obtain your AVS? MyChart  If the video visit is dropped, the invitation should be resent by: Text to cell phone: [phone]  Will anyone else be joining your video visit? No          Assessment & Plan     H/O motion sickness  We discussed treatment options to prevent/treat motion sickness for upcoming trip.  Recommend she : Apply 1 patch to hairless area behind one ear at least 4 hours before travel.  Remove old patch and change every 3 days (72 hours).  Length of trip: 8 days      - scopolamine (TRANSDERM) 1 MG/3DAYS 72 hr patch; Place 1 patch onto the skin every 72 hours    Osteoarthritis resulting from left hip dysplasia      I discussed the pathophysiology of osteoarthritis and the progressive nature of this disease.  I suggest checking an xray, given the hip pain has progressed over the last 6 months.  I recommend she start ROM/strengthening exercises/ physical therapy.   Topical arthritis cream advised.     - XR Hip Left 2-3 Views; Future  - Physical Therapy Referral; Future    CARDIOVASCULAR SCREENING; LDL GOAL LESS THAN 160  Due for repeat fasting labs.   - Lipid panel reflex to direct LDL Fasting; Future    Family history of thyroid disease  She would like to continue to monitor.   - TSH with free T4 reflex; Future    Altered bowel function  She would like to have basic labwork to ensure there is nothing else going on.    - Comprehensive metabolic panel (BMP + Alb, Alk Phos, ALT, AST, Total. Bili, TP); Future  - CBC with platelets; Future    Coryza  Likely viral or seasonal allergies.  I suggest flonase, mucinex and an anti-histamine.       24 minutes spent by me on the date of the encounter doing chart review, history and exam, documentation and further activities per the note       FUTURE APPOINTMENTS:       - Follow-up visit in 2-3 months (annual preventative visit)    LUCIANO Thomas [facility]  [facility] [place] is a 62 year old, presenting for the following health issues:  Medication Request      Patient arrived requesting medication for nausea due to past experiences of seasickness and upcoming cruise 11/15/23.  (8 days)    History of Present Illness       Reason for visit:  Sea sickness patch and left hip pain  Symptom onset:  More than a month  Symptoms include:  Pain in left hip when laying on left side  Symptom intensity:  Mild  Symptom progression:  Staying the same  Had these symptoms before:  No  What makes it worse:  Laying on the left side.  What makes it better:  Not laying on the left side.    She eats 0-1 servings of fruits and vegetables daily.She consumes 1 sweetened beverage(s) daily.She exercises with enough effort to increase her heart rate 20 to 29 minutes per day.  She exercises with enough effort to increase her heart rate 5 days per week.   She is taking medications regularly.     Hip pain [employer] [facility] message:     I have been having discomfort in my left hip for over 6 months. It is not extreme pain, but is getting worse over time. I am stiff and limp when getting up after being stationary for a time. After moving a bit the limp disappears. Exercise doesn't appear to make it worse. Laying on my left side causes the discomfort. I feel like I should have it evaluated.     Pain comes and goes.    Has tried ibuprofen and this does help.  Currently hip pain is fine.  Sometimes the pain will achy down the leg.    Improves if she walks on it.    No numbness or tingling.    No sharp shooting pains.   No injury recalled.      She has also had some postnasal drip and congestion over the past 2 weeks.  Tried mucinex.  She did have flu and covid booster last week.    She did have pain in the sinuses when her symptoms started.  This has resolved.  Had a low grade fever x 1 day at the beginning of her symptoms.      She has a family history of thyroid disease and would like  this rechecked.   She also would like to have new fasting labs drawn and will follow-up in clinic to review (during annual physical).   She has noticed a change in bowels, for the better she states.              Review of Systems   Constitutional, HEENT, cardiovascular, pulmonary, gi and gu systems are negative, except as otherwise noted.      Objective           Vitals:  No vitals were obtained today due to virtual visit.    Physical Exam   GENERAL: Healthy, alert and no distress  EYES: Eyes grossly normal to inspection.  No discharge or erythema, or obvious scleral/conjunctival abnormalities.  RESP: No audible wheeze, cough, or visible cyanosis.  No visible retractions or increased work of breathing.    SKIN: Visible skin clear. No significant rash, abnormal pigmentation or lesions.  NEURO: Cranial nerves grossly intact.  Mentation and speech appropriate for age.  PSYCH: Mentation appears normal, affect normal/bright, judgement and insight intact, normal speech and appearance well-groomed.                Video-Visit Details    Type of service:  Video Visit     Originating Location (pt. Location): Home    Distant Location (provider location):  Off-site  Platform used for Video Visit: Francesca

## 2023-10-27 ENCOUNTER — TELEPHONE (OUTPATIENT)
Dept: FAMILY MEDICINE | Facility: CLINIC | Age: 63
End: 2023-10-27

## 2023-10-27 ENCOUNTER — TELEPHONE (OUTPATIENT)
Dept: FAMILY MEDICINE | Facility: CLINIC | Age: 63
End: 2023-10-27
Payer: COMMERCIAL

## 2023-10-27 NOTE — TELEPHONE ENCOUNTER
Patient Quality Outreach    Patient is due for the following:   Breast Cancer Screening - Mammogram  Physical Preventive Adult Physical  There are no preventive care reminders to display for this patient.      Type of outreach:    Sent [employer] message.      Questions for provider review:    None           Ashley Rinaldi MA  Chart routed to Care Team.

## 2023-10-27 NOTE — TELEPHONE ENCOUNTER
.Prior Authorization Retail Medication Request    Medication/Dose: scopolamine (TRANSDERM) 1 MG/3DAYS 72 hr patch   ICD code (if different than what is on RX):  Z87.898   Previously Tried and Failed:  na  Rationale:  na    Insurance Name:  [facility]  Insurance ID:  [id number]       Pharmacy Information (if different than what is on RX)  [facility]   Phone:  [phone]

## 2023-10-27 NOTE — LETTER
2023    INSURER: Payor: [employer] / Plan: [employer] / Product Type: HMO /   Re: Prior Authorization Request  Patient: Taisha Yanez  Policy ID#:  [id number]  : 1960      To Whom it May Concern:    I am writing to formally request a prior authorization of coverage for my patient,  Taisha Yanez, for treatment using scopolamine transdermal patch to help her manage sea sickness on an upcoming cruise. She has struggled with sea sickness in the past and the scopolamine has helped greatly.  I do not recommend an as needed medication such as meclizine, zofran or prochlorperazine, as this is an 8 day cruise.  I do not recommend she trial the zofran, prochlorperazine or meclizine during her trip, as it will then be too late likely to start the scopolamine patch and her symptoms likely would not resolve until she disembarks the ship.      I firmly believe that this therapy is clinically appropriate and that Taisha Yanez would benefit from improved management of her sea sickness and be allowed to enjoy her trip if she is given the opportunity to receive this treatment.  Please contact me at Dept: [phone] if you require additional information to ensure the prompt approval for coverage.    Please send your written decision to me at this address:  [facility] [place]  [address]  [place] [address] [phone]  Dept: [phone]    Sincerely,      LUCIANO Thomas

## 2023-10-31 NOTE — TELEPHONE ENCOUNTER
Central Prior Authorization Team   Phone: [phone]    PA Initiation    Medication: scopolamine (TRANSDERM) 1 MG/3DAYS 72 hr patch  Insurance Company: [employer] - Phone [phone] Fax [phone]  Pharmacy Filling the Rx: [facility] [place] - [address]  Filling Pharmacy Phone: [phone]  Filling Pharmacy Fax:    Start Date: 10/31/2023

## 2023-11-01 NOTE — TELEPHONE ENCOUNTER
PRIOR AUTHORIZATION DENIED    Medication: scopolamine (TRANSDERM) 1 MG/3DAYS 72 hr patch    Denial Date: 10/31/2023    Denial Rational:  Patient must have a history of trial & failure to the formulary alternative(s) or have a contraindication or intolerance to the formulary alternatives:              Appeal Information:    If you would like to appeal, please supply P/A team with a letter of medical necessity with clinical reason.

## 2023-11-02 ENCOUNTER — ANCILLARY PROCEDURE (OUTPATIENT)
Dept: GENERAL RADIOLOGY | Facility: CLINIC | Age: 63
End: 2023-11-02
Attending: PHYSICIAN ASSISTANT
Payer: COMMERCIAL

## 2023-11-02 DIAGNOSIS — M16.32 OSTEOARTHRITIS RESULTING FROM LEFT HIP DYSPLASIA: ICD-10-CM

## 2023-11-02 PROCEDURE — 73502 X-RAY EXAM HIP UNI 2-3 VIEWS: CPT | Mod: TC | Performed by: RADIOLOGY

## 2023-11-02 NOTE — TELEPHONE ENCOUNTER
Medication Appeal Initiation    We have initiated an appeal for the requested medication:  Medication: scopolamine (TRANSDERM) 1 MG/3DAYS 72 hr patch  Appeal Start Date:  11/2/2023  Insurance Company:  [employer] - Phone [phone] Fax [phone]  Comments:

## 2023-11-03 NOTE — TELEPHONE ENCOUNTER
Quintin from appeals said that the decision was over turned and now patient can receive medication.

## 2023-11-06 ENCOUNTER — MYC MEDICAL ADVICE (OUTPATIENT)
Dept: FAMILY MEDICINE | Facility: CLINIC | Age: 63
End: 2023-11-06
Payer: COMMERCIAL

## 2023-11-06 ENCOUNTER — E-VISIT (OUTPATIENT)
Dept: FAMILY MEDICINE | Facility: CLINIC | Age: 63
End: 2023-11-06
Payer: COMMERCIAL

## 2023-11-06 DIAGNOSIS — J01.90 ACUTE SINUSITIS WITH SYMPTOMS > 10 DAYS: Primary | ICD-10-CM

## 2023-11-06 PROCEDURE — 99421 OL DIG E/M SVC 5-10 MIN: CPT | Performed by: PHYSICIAN ASSISTANT

## 2023-11-06 NOTE — TELEPHONE ENCOUNTER
I called the pharmacy and they received a paid claim. **Instructed pharmacy to notify patient when script is ready to /ship.**

## 2023-11-08 ENCOUNTER — E-VISIT (OUTPATIENT)
Dept: URGENT CARE | Facility: CLINIC | Age: 63
End: 2023-11-08
Payer: COMMERCIAL

## 2023-11-08 DIAGNOSIS — Z53.9 DIAGNOSIS NOT YET DEFINED: Primary | ICD-10-CM

## 2023-11-08 RX ORDER — IPRATROPIUM BROMIDE 42 UG/1
2 SPRAY, METERED NASAL 4 TIMES DAILY
Qty: 15 ML | Refills: 0 | Status: SHIPPED | OUTPATIENT
Start: 2023-11-08 | End: 2024-08-15

## 2023-11-08 NOTE — PATIENT INSTRUCTIONS
Pierre Sumner,   Sorry for the delay in getting back to you, I was out of the office until today.  I have sent in an antibiotic and a nasal spray to help open and clear out the sinuses.     Hope you feel better soon.    Cathryn Hills PA-C      Acute Sinusitis: Care Instructions  Overview     Acute sinusitis is an inflammation of the mucous membranes inside the nose and sinuses. Sinuses are the hollow spaces in your skull around the eyes and nose. Acute sinusitis often follows a cold. Acute sinusitis causes thick, discolored mucus that drains from the nose or down the back of the throat. It also can cause pain and pressure in your head and face along with a stuffy or blocked nose.  In most cases, sinusitis gets better on its own in 1 to 2 weeks. But some mild symptoms may last for several weeks. Sometimes antibiotics are needed if there is a bacterial infection.  Follow-up care is a key part of your treatment and safety. Be sure to make and go to all appointments, and call your doctor if you are having problems. It's also a good idea to know your test results and keep a list of the medicines you take.  How can you care for yourself at home?  Use saline (saltwater) nasal washes. This can help keep your nasal passages open and wash out mucus and allergens.  You can buy saline nose washes at a grocery store or drugstore. Follow the instructions on the package.  You can make your own at home. Add 1 teaspoon of non-iodized salt and 1 teaspoon of baking soda to 2 cups of distilled or boiled and cooled water. Fill a squeeze bottle or a nasal cleansing pot (such as a neti pot) with the nasal wash. Then put the tip into your nostril, and lean over the sink. With your mouth open, gently squirt the liquid. Repeat on the other side.  Try a decongestant nasal spray like oxymetazoline (Afrin). Do not use it for more than 3 days in a row. Using it for more than 3 days can make your congestion worse.  If needed, take an over-the-counter  "pain medicine, such as acetaminophen (Tylenol), ibuprofen (Advil, Motrin), or naproxen (Aleve). Read and follow all instructions on the label.  If the doctor prescribed antibiotics, take them as directed. Do not stop taking them just because you feel better. You need to take the full course of antibiotics.  Be careful when taking over-the-counter cold or flu medicines and Tylenol at the same time. Many of these medicines have acetaminophen, which is Tylenol. Read the labels to make sure that you are not taking more than the recommended dose. Too much acetaminophen (Tylenol) can be harmful.  Try a steroid nasal spray. It may help with your symptoms.  Breathe warm, moist air. You can use a steamy shower, a hot bath, or a sink filled with hot water. Avoid cold, dry air. Using a humidifier in your home may help. Follow the directions for cleaning the machine.  When should you call for help?   Call your doctor now or seek immediate medical care if:    You have new or worse swelling, redness, or pain in your face or around one or both of your eyes.     You have double vision or a change in your vision.     You have a high fever.     You have a severe headache and a stiff neck.     You have mental changes, such as feeling confused or much less alert.   Watch closely for changes in your health, and be sure to contact your doctor if:    You are not getting better as expected.   Where can you learn more?  Go to https://www.[employer].net/patiented  Enter I933 in the search box to learn more about \"Acute Sinusitis: Care Instructions.\"  Current as of: February 28, 2023               Content Version: 13.8    9218-9165 [employer].   Care instructions adapted under license by your healthcare professional. If you have questions about a medical condition or this instruction, always ask your healthcare professional. [employer] disclaims any warranty or liability for your use of this information.      "

## 2023-11-08 NOTE — PATIENT INSTRUCTIONS
Dear Taisha Yanez,    I see that your primary care provider responded to your previous E-visit and sent in an antibiotic.  You will not be charged for this Visit. Thank you for trusting us with your care.    Melida Urena PA-C

## 2023-11-27 NOTE — TELEPHONE ENCOUNTER
Patient Quality Outreach        Type of outreach:    Patient has mammo schedule for 1/11/24.

## 2023-11-28 ENCOUNTER — MYC MEDICAL ADVICE (OUTPATIENT)
Dept: FAMILY MEDICINE | Facility: CLINIC | Age: 63
End: 2023-11-28
Payer: COMMERCIAL

## 2023-12-13 ENCOUNTER — LAB (OUTPATIENT)
Dept: LAB | Facility: CLINIC | Age: 63
End: 2023-12-13
Payer: COMMERCIAL

## 2023-12-13 DIAGNOSIS — Z13.6 CARDIOVASCULAR SCREENING; LDL GOAL LESS THAN 160: ICD-10-CM

## 2023-12-13 DIAGNOSIS — Z83.49 FAMILY HISTORY OF THYROID DISEASE: ICD-10-CM

## 2023-12-13 DIAGNOSIS — Z11.4 SCREENING FOR HIV (HUMAN IMMUNODEFICIENCY VIRUS): ICD-10-CM

## 2023-12-13 DIAGNOSIS — R19.8 ALTERED BOWEL FUNCTION: ICD-10-CM

## 2023-12-13 LAB
ALBUMIN SERPL BCG-MCNC: 4.3 G/DL (ref 3.5–5.2)
ALP SERPL-CCNC: 75 U/L (ref 40–150)
ALT SERPL W P-5'-P-CCNC: 19 U/L (ref 0–50)
ANION GAP SERPL CALCULATED.3IONS-SCNC: 9 MMOL/L (ref 7–15)
AST SERPL W P-5'-P-CCNC: 20 U/L (ref 0–45)
BILIRUB SERPL-MCNC: 0.5 MG/DL
BUN SERPL-MCNC: 13.4 MG/DL (ref 8–23)
CALCIUM SERPL-MCNC: 9.6 MG/DL (ref 8.8–10.2)
CHLORIDE SERPL-SCNC: 105 MMOL/L (ref 98–107)
CHOLEST SERPL-MCNC: 214 MG/DL
CREAT SERPL-MCNC: 0.83 MG/DL (ref 0.51–0.95)
DEPRECATED HCO3 PLAS-SCNC: 26 MMOL/L (ref 22–29)
EGFRCR SERPLBLD CKD-EPI 2021: 80 ML/MIN/1.73M2
ERYTHROCYTE [DISTWIDTH] IN BLOOD BY AUTOMATED COUNT: 12.5 % (ref 10–15)
FASTING STATUS PATIENT QL REPORTED: YES
GLUCOSE SERPL-MCNC: 86 MG/DL (ref 70–99)
HCT VFR BLD AUTO: 45.3 % (ref 35–47)
HDLC SERPL-MCNC: 55 MG/DL
HGB BLD-MCNC: 14.5 G/DL (ref 11.7–15.7)
HIV 1+2 AB+HIV1 P24 AG SERPL QL IA: NONREACTIVE
LDLC SERPL CALC-MCNC: 148 MG/DL
MCH RBC QN AUTO: 30.5 PG (ref 26.5–33)
MCHC RBC AUTO-ENTMCNC: 32 G/DL (ref 31.5–36.5)
MCV RBC AUTO: 95 FL (ref 78–100)
NONHDLC SERPL-MCNC: 159 MG/DL
PLATELET # BLD AUTO: 300 10E3/UL (ref 150–450)
POTASSIUM SERPL-SCNC: 4.3 MMOL/L (ref 3.4–5.3)
PROT SERPL-MCNC: 7.3 G/DL (ref 6.4–8.3)
RBC # BLD AUTO: 4.76 10E6/UL (ref 3.8–5.2)
SODIUM SERPL-SCNC: 140 MMOL/L (ref 135–145)
TRIGL SERPL-MCNC: 53 MG/DL
TSH SERPL DL<=0.005 MIU/L-ACNC: 2.32 UIU/ML (ref 0.3–4.2)
WBC # BLD AUTO: 7.8 10E3/UL (ref 4–11)

## 2023-12-13 PROCEDURE — 87389 HIV-1 AG W/HIV-1&-2 AB AG IA: CPT

## 2023-12-13 PROCEDURE — 80053 COMPREHEN METABOLIC PANEL: CPT

## 2023-12-13 PROCEDURE — 80061 LIPID PANEL: CPT

## 2023-12-13 PROCEDURE — 85027 COMPLETE CBC AUTOMATED: CPT

## 2023-12-13 PROCEDURE — 84443 ASSAY THYROID STIM HORMONE: CPT

## 2023-12-13 PROCEDURE — 36415 COLL VENOUS BLD VENIPUNCTURE: CPT

## 2024-01-16 ENCOUNTER — HOSPITAL ENCOUNTER (OUTPATIENT)
Dept: MAMMOGRAPHY | Facility: CLINIC | Age: 64
Discharge: HOME | End: 2024-01-16
Attending: PHYSICIAN ASSISTANT | Admitting: PHYSICIAN ASSISTANT
Payer: COMMERCIAL

## 2024-01-16 DIAGNOSIS — Z12.31 VISIT FOR SCREENING MAMMOGRAM: ICD-10-CM

## 2024-01-16 PROCEDURE — 77063 BREAST TOMOSYNTHESIS BI: CPT

## 2024-01-30 ASSESSMENT — ENCOUNTER SYMPTOMS
PARESTHESIAS: 0
SHORTNESS OF BREATH: 0
HEADACHES: 0
PALPITATIONS: 0
BREAST MASS: 0
COUGH: 0
DIZZINESS: 0
EYE PAIN: 0
DIARRHEA: 0
CHILLS: 0
HEMATOCHEZIA: 0
NAUSEA: 1
ABDOMINAL PAIN: 0
NERVOUS/ANXIOUS: 0
MYALGIAS: 0
SORE THROAT: 0
JOINT SWELLING: 0
CONSTIPATION: 0
DYSURIA: 0
FEVER: 0
WEAKNESS: 0
HEMATURIA: 0
ARTHRALGIAS: 0
HEARTBURN: 1
FREQUENCY: 0

## 2024-01-31 ENCOUNTER — OFFICE VISIT (OUTPATIENT)
Dept: FAMILY MEDICINE | Facility: CLINIC | Age: 64
End: 2024-01-31
Payer: COMMERCIAL

## 2024-01-31 ENCOUNTER — ANCILLARY PROCEDURE (OUTPATIENT)
Dept: GENERAL RADIOLOGY | Facility: CLINIC | Age: 64
End: 2024-01-31
Attending: PHYSICIAN ASSISTANT
Payer: COMMERCIAL

## 2024-01-31 VITALS
SYSTOLIC BLOOD PRESSURE: 108 MMHG | DIASTOLIC BLOOD PRESSURE: 62 MMHG | HEART RATE: 99 BPM | OXYGEN SATURATION: 97 % | TEMPERATURE: 98.5 F | RESPIRATION RATE: 16 BRPM

## 2024-01-31 DIAGNOSIS — M79.672 PAIN OF LEFT HEEL: ICD-10-CM

## 2024-01-31 DIAGNOSIS — K44.9 HIATAL HERNIA: ICD-10-CM

## 2024-01-31 DIAGNOSIS — K21.00 GASTROESOPHAGEAL REFLUX DISEASE WITH ESOPHAGITIS WITHOUT HEMORRHAGE: ICD-10-CM

## 2024-01-31 DIAGNOSIS — Z00.00 ROUTINE GENERAL MEDICAL EXAMINATION AT A HEALTH CARE FACILITY: Primary | ICD-10-CM

## 2024-01-31 DIAGNOSIS — F33.41 RECURRENT MAJOR DEPRESSIVE DISORDER, IN PARTIAL REMISSION (H): ICD-10-CM

## 2024-01-31 DIAGNOSIS — R11.2 NAUSEA AND VOMITING, UNSPECIFIED VOMITING TYPE: ICD-10-CM

## 2024-01-31 PROCEDURE — 99214 OFFICE O/P EST MOD 30 MIN: CPT | Mod: 25 | Performed by: PHYSICIAN ASSISTANT

## 2024-01-31 PROCEDURE — 73650 X-RAY EXAM OF HEEL: CPT | Mod: TC | Performed by: RADIOLOGY

## 2024-01-31 PROCEDURE — 99396 PREV VISIT EST AGE 40-64: CPT | Performed by: PHYSICIAN ASSISTANT

## 2024-01-31 RX ORDER — FAMOTIDINE 20 MG/1
20 TABLET, FILM COATED ORAL EVERY EVENING
Qty: 30 TABLET | Refills: 1 | Status: SHIPPED | OUTPATIENT
Start: 2024-01-31 | End: 2024-02-29

## 2024-01-31 RX ORDER — FLUOXETINE 10 MG/1
10 CAPSULE ORAL DAILY
Qty: 30 CAPSULE | Refills: 1 | Status: SHIPPED | OUTPATIENT
Start: 2024-01-31 | End: 2024-02-29

## 2024-01-31 RX ORDER — ONDANSETRON 4 MG/1
4 TABLET, ORALLY DISINTEGRATING ORAL EVERY 8 HOURS PRN
Qty: 18 TABLET | Refills: 0 | Status: SHIPPED | OUTPATIENT
Start: 2024-01-31 | End: 2024-03-19

## 2024-01-31 ASSESSMENT — ENCOUNTER SYMPTOMS
FEVER: 0
COUGH: 0
PALPITATIONS: 0
DIZZINESS: 0
CONSTIPATION: 0
MYALGIAS: 0
ABDOMINAL PAIN: 0
CHILLS: 0
SHORTNESS OF BREATH: 0
ARTHRALGIAS: 0
HEADACHES: 0
NAUSEA: 1
HEMATURIA: 0
NERVOUS/ANXIOUS: 0
WEAKNESS: 0
DYSURIA: 0
EYE PAIN: 0
SORE THROAT: 0
JOINT SWELLING: 0
DIARRHEA: 0
FREQUENCY: 0

## 2024-01-31 NOTE — PROGRESS NOTES
Preventive Care Visit  [facility] MICHELLE Hills PA-C, Family Medicine  Jan 31, 2024    Assessment & Plan     Routine general medical examination at a health care facility      HEALTH CARE MAINTENANCE              Reviewed USPTF recommendations and anticipatory guidance.              See orders.   RSV shot recommended at pharmacy.     Mammo recently done and normal.     Hiatal hernia  EGD from 2 years ago showed a small hiatal hernia.  May need to recheck or refer to GI if symptoms do not improve with PPI/H2 blocker.     Pain of left heel  Further evaluation with an xray today (as pain is not right at the plantar fascia insertion site.  If xray normal, will treat as a plantar fasciitis.     Plantar fasciitis     I discussed the pathophysiology of plantar fasciitis with Taisha Yanez including appropriate rehabilitation.   ice area (frozen water bottle works great for this) to treat or prevent pain, will hold off on NSAIDs given GI symptoms.  I recommend she wear appropriate footwear (may try Spenco arch supports or superfeet) and arise slowly from sitting position.      I discussed the step-wise approach to treating plantar fasciitis.  Will start with conservative treatment at this time, and if symptoms persist may consider referral to podiatry for further care such as steroid injections.      Will consider a posterior night splint if not improving over the next 2-3 weeks.     Plantar Fascia stretching exercising given to patient.      Patient advised to follow-up in clinic if symptoms worsen or fail to improve as anticipated.     - XR Calcaneus Left G/E 2 Views    Gastroesophageal reflux disease with esophagitis without hemorrhage  Will try PPI and H2 blocker combo consistently.  Limit acidic foods.   - omeprazole (PRILOSEC) 20 MG DR capsule; Take 1 capsule (20 mg) by mouth daily  - famotidine (PEPCID) 20 MG tablet; Take 1 tablet (20 mg) by mouth every evening    Recurrent major  depressive disorder, in partial remission (H24)  Will restart prozac at low dose, counseling advised.   - FLUoxetine (PROZAC) 10 MG capsule; Take 1 capsule (10 mg) by mouth daily  - Adult Mental Health  Referral; Future    Nausea and vomiting, unspecified vomiting type  Will first treat GERD and may use zofran PRN.    May consider referral to GI if symptoms do not improve as expected.   - omeprazole (PRILOSEC) 20 MG DR capsule; Take 1 capsule (20 mg) by mouth daily  - famotidine (PEPCID) 20 MG tablet; Take 1 tablet (20 mg) by mouth every evening  - ondansetron (ZOFRAN ODT) 4 MG ODT tab; Take 1 tablet (4 mg) by mouth every 8 hours as needed for nausea or vomiting      42 minutes spent by me on the date of the encounter doing chart review, history and exam, documentation and further activities per the note      Counseling  Appropriate preventive services were discussed with this patient, including applicable screening as appropriate for fall prevention, nutrition, physical activity, Tobacco-use cessation, weight loss and cognition.  Checklist reviewing preventive services available has been given to the patient.  Reviewed patient's diet, addressing concerns and/or questions.   The patient was instructed to see the dentist every 6 months.     Patient has been advised of split billing requirements and indicates understanding: Yes    FUTURE APPOINTMENTS:       - Follow-up visit in 1 month (video visit)    Nataliia Sumner is a 63 year old, presenting for the following:  Physical        1/31/2024     2:29 PM   Additional Questions   Roomed by Trisha   Accompanied by Self         1/31/2024     2:29 PM   Patient Reported Additional Medications   Patient reports taking the following new medications na        Health Care Directive  Patient does not have a Health Care Directive or Living Will: not discussed.  Healthy Habits:     Getting at least 3 servings of Calcium per day:  NO    Bi-annual eye exam:  Yes    Dental  care twice a year:  NO    Sleep apnea or symptoms of sleep apnea:  None    Diet:  Regular (no restrictions)    Frequency of exercise:  None    Taking medications regularly:  Yes    Medication side effects:  Not applicable    Additional concerns today:  Yes    Patient arrived for Annual Physical, not due for PAP, declined breast exam.     Had 3 episodes of severe vomiting, August 19, Nov 17 (traveled in car prior), Dec 31 (was flying).   Episodes are quite extreme and she is worried about this. Episodes lasted 2 hours.  No dizziness.    She has h/o mild/moderate nausea in the past (these episodes were more severe).   C/o lots of acid as well.   Taking famotidine 20 mg once per day (not consistent).  Was previously on ranitidine.   No headaches, no fevers, no blood.   She stopped eating deep fried foods to help with this.   No longer eating pork.     C/o excessive belching.    Has mild abdominal pain at times, not consistent.     Does have a small hiatal hernia noted on EGD 4/22/21.        She c/o pain in her left foot (more on the side or bottom).  No numbness or tingling.      Was on prozac in the past (didn't feel the extreme emotions).     Started magnesium to help with her feet.                  10/26/2023   Social Factors   Worry food won't last until get money to buy more Patient refused   Food not last or not have enough money for food? Patient refused   Do you have housing?  Patient refused   Are you worried about losing your housing? Patient refused   Lack of transportation? Patient refused   Unable to get utilities (heat,electricity)? Patient refused          No data to display                         1/30/2024   Substance Use   Alcohol more than 3/day or more than 7/wk No     Social History     Tobacco Use    Smoking status: Never    Smokeless tobacco: Never   Vaping Use    Vaping Use: Never used   Substance Use Topics    Alcohol use: Yes    Drug use: No           1/30/2024   LAST FHS-7 RESULTS   1st  degree relative breast or ovarian cancer Yes   Any relative bilateral breast cancer No   Any male have breast cancer No   Any woman have breast and ovarian cancer Yes   Any woman with breast cancer before 50yrs No   2 or more relatives with breast and/or ovarian cancer Unknown   2 or more relatives with breast and/or bowel cancer Unknown        Annual screen by mutual decision with patient  History of abnormal Pap smear: Status post benign hysterectomy. Health Maintenance and Surgical History updated.       The 10-year ASCVD risk score (Tania OWUSU, et al., 2019) is: 3.1%    Values used to calculate the score:      Age: 63 years      Sex: Female      Is Non- : No      Diabetic: No      Tobacco smoker: No      Systolic Blood Pressure: 108 mmHg      Is BP treated: No      HDL Cholesterol: 55 mg/dL      Total Cholesterol: 214 mg/dL           Reviewed and updated as needed this visit by Provider                    Past Medical History:   Diagnosis Date    Arthritis     Depressive disorder     PONV (postoperative nausea and vomiting)      Past Surgical History:   Procedure Laterality Date    COLONOSCOPY N/A 4/22/2021    Procedure: COLONOSCOPY;  Surgeon: Guilherme Hurtado MD;  Location: [facility]    ESOPHAGOSCOPY, GASTROSCOPY, DUODENOSCOPY (EGD), COMBINED N/A 4/22/2021    Procedure: ESOPHAGOGASTRODUODENOSCOPY (EGD);  Surgeon: Guilherme Hurtado MD;  Location: [facility]    HYSTERECTOMY, PAP NO LONGER INDICATED       Review of Systems   Constitutional:  Negative for chills and fever.   HENT:  Negative for congestion, ear pain, hearing loss and sore throat.    Eyes:  Negative for pain and visual disturbance.   Respiratory:  Negative for cough and shortness of breath.    Cardiovascular:  Negative for chest pain and palpitations.   Gastrointestinal:  Positive for nausea. Negative for abdominal pain, constipation and diarrhea.   Genitourinary:  Negative for dysuria, frequency, genital sores, hematuria, pelvic pain,  "urgency, vaginal bleeding and vaginal discharge.   Musculoskeletal:  Negative for arthralgias, joint swelling and myalgias.   Skin:  Negative for rash.   Neurological:  Negative for dizziness, weakness and headaches.   Psychiatric/Behavioral:  The patient is not nervous/anxious.        Review of Systems  Constitutional, neuro, ENT, endocrine, pulmonary, cardiac, gastrointestinal, genitourinary, musculoskeletal, integument and psychiatric systems are negative, except as otherwise noted.     Objective    Exam  /62 (BP Location: Left arm, Patient Position: Chair, Cuff Size: Adult Regular)   Pulse 99   Temp 98.5  F (36.9  C) (Tympanic)   Resp 16   SpO2 97%    Estimated body mass index is 26.63 kg/m  as calculated from the following:    Height as of 4/22/21: 1.651 m (5' 5\").    Weight as of 4/22/21: 72.6 kg (160 lb).  Physical Exam  GENERAL: alert and no distress  EYES: Eyes grossly normal to inspection, PERRL and conjunctivae and sclerae normal  HENT: ear canals and TM's normal, nose and mouth without ulcers or lesions  NECK: no adenopathy, no asymmetry, masses, or scars  RESP: lungs clear to auscultation - no rales, rhonchi or wheezes  CV: regular rate and rhythm, normal S1 S2, no S3 or S4, no murmur, click or rub, no peripheral edema  ABDOMEN: soft,mild epigastric tenderness, no hepatosplenomegaly, no masses and bowel sounds normal  MS: no gross musculoskeletal defects noted, no edema  SKIN: no suspicious lesions or rashes  NEURO: Normal strength and tone, mentation intact and speech normal  PSYCH: mentation appears normal, affect normal/bright  LYMPH: no cervical, supraclavicular, axillary, or inguinal adenopathy  Foot examination performed.  No lesions or skin breakdown noted.   Dorsalis pedis and posterior tibialis pulses intact bilaterally.  Tenderness over medial aspect of heel (point tenderness).      Signed Electronically by: Cathryn Hills PA-C    Answers submitted by the patient for this " visit:  Annual Preventive Visit (Submitted on 1/30/2024)  Chief Complaint: Annual Exam:  Blood in stool: No  heartburn: Yes  peripheral edema: No  mood changes: Yes  Skin sensation changes: No  tenderness: No  breast mass: No  breast discharge: No

## 2024-01-31 NOTE — PATIENT INSTRUCTIONS
"GERD/vomiting:   I suggest starting prilosec in the morning and take famotidine in the evening.  Avoid acidic foods.   Will recheck in 1 month.     Depression:  Let's restart low dose prozac.   Referral placed for counseling as well.     Plantar Fasciitis:    * Superfeet insoles ([employer] or [employer] [place], [[address] [place] or [employer] [place] [[address]]) or spenco arch supports are helpful.    * Wear indoor house shoe (avoid walking barefoot)    * Good walking shoes or running shoes that would be appropriate for your activities.    * Try to minimize the pounding on your feet.  Less high impact and more low.  Minimize quick/accelerating movements (jumping or running fast).    * Stretching twice daily.  30-45 seconds with each stretch.    * Ice if possible after activity  (freeze a water bottle and roll the bottom of the foot over it).    * Warm-ups in bed before getting up.    * Posterior night splint if not improving in the next 2-3 weeks.    Vaccines:  I do recommend an RSV shot at the pharmacy    Learning About Being Physically Active  What is physical activity?     Being physically active means doing any kind of activity that gets your body moving.  The types of physical activity that can help you get fit and stay healthy include:  Aerobic or \"cardio\" activities. These make your heart beat faster and make you breathe harder, such as brisk walking, riding a bike, or running. They strengthen your heart and lungs and build up your endurance.  Strength training activities. These make your muscles work against, or \"resist,\" something. Examples include lifting weights or doing push-ups. These activities help tone and strengthen your muscles and bones.  Stretches. These let you move your joints and muscles through their full range of motion. Stretching helps you be more flexible.  Reaching a balance between these three types of physical activity is important because each one " "contributes to your overall fitness.  What are the benefits of being active?  Being active is one of the best things you can do for your health. It helps you to:  Feel stronger and have more energy to do all the things you like to do.  Focus better at school or work.  Feel, think, and sleep better.  Reach and stay at a healthy weight.  Lose fat and build lean muscle.  Lower your risk for serious health problems, including diabetes, heart attack, high blood pressure, and some cancers.  Keep your heart, lungs, bones, muscles, and joints strong and healthy.  How can you make being active part of your life?  Start slowly. Make it your long-term goal to get at least 30 minutes of exercise on most days of the week. Walking is a good choice. You also may want to do other activities, such as running, swimming, cycling, or playing tennis or team sports.  Pick activities that you like--ones that make your heart beat faster, your muscles stronger, and your muscles and joints more flexible. If you find more than one thing you like doing, do them all. You don't have to do the same thing every day.  Get your heart pumping every day. Any activity that makes your heart beat faster and keeps it at that rate for a while counts.  Here are some great ways to get your heart beating faster:  Go for a brisk walk, run, or hike.  Go for a swim or bike ride.  Take an online exercise class or dance.  Play a game of touch football, basketball, or soccer.  Play tennis, pickleball, or racquetball.  Climb stairs.  Even some household chores can be aerobic. Just do them at a faster pace. Raking or mowing the lawn, sweeping the garage, and vacuuming and cleaning your home all can help get your heart rate up.  Strengthen your muscles during the week. You don't have to lift heavy weights or grow big, bulky muscles to get stronger. Doing a few simple activities that make your muscles work against, or \"resist,\" something can help you get stronger. Aim " "for at least twice a week.  For example, you can:  Do push-ups or sit-ups, which use your own body weight as resistance.  Lift weights or dumbbells or use stretch bands at home or in a gym or community center.  Stretch your muscles often. Stretching will help you as you become more active. It can help you stay flexible and loosen tight muscles. It can also help improve your balance and posture and can be a great way to relax.  Be sure to stretch the muscles you'll be using when you work out. It's best to warm your muscles slightly before you stretch them. Walk or do some other light aerobic activity for a few minutes. Then start stretching.  When you stretch your muscles:  Do it slowly. Stretching is not about going fast or making sudden movements.  Don't push or bounce during a stretch.  Hold each stretch for at least 15 to 30 seconds, if you can. You should feel a stretch in the muscle, but not pain.  Breathe out as you do the stretch. Then breathe in as you hold the stretch. Don't hold your breath.  If you're worried about how more activity might affect your health, have a checkup before you start. Follow any special advice your doctor gives you for getting a smart start.  Where can you learn more?  Go to https://www.[employer]net/patiented  Enter W332 in the search box to learn more about \"Learning About Being Physically Active.\"  Current as of: June 6, 2023               Content Version: 13.8    8489-0500 [email or web address].   Care instructions adapted under license by your healthcare professional. If you have questions about a medical condition or this instruction, always ask your healthcare professional. [email or web address] disclaims any warranty or liability for your use of this information.      Eating Healthy Foods: Care Instructions  With every meal, you can make healthy food choices. Try to eat a variety of fruits, vegetables, whole grains, lean proteins, and low-fat dairy products. This can " "help you get the right balance of nutrients, including vitamins and minerals. Small changes add up over time. You can start by adding one healthy food to your meals each day.    Try to make half your plate fruits and vegetables, one-fourth whole grains, and one-fourth lean proteins. Try including dairy with your meals.   Eat more fruits and vegetables. Try to have them with most meals and snacks.   Foods for healthy eating    Fruits    These can be fresh, frozen, canned, or dried.  Try to choose whole fruit rather than fruit juice.  Eat a variety of colors.    Vegetables    These can be fresh, frozen, canned, or dried.  Beans, peas, and lentils count too.    Whole grains    Choose whole-grain breads, cereals, and noodles.  Try brown rice.    Lean proteins    These can include lean meat, poultry, fish, and eggs.  You can also have tofu, beans, peas, lentils, nuts, and seeds.    Dairy    Try milk, yogurt, and cheese.  Choose low-fat or fat-free when you can.  If you need to, use lactose-free milk or fortified plant-based milk products, such as soy milk.    Water    Drink water when you're thirsty.  Limit sugar-sweetened drinks, including soda, fruit drinks, and sports drinks.  Where can you learn more?  Go to https://www.[employer].net/patiented  Enter T756 in the search box to learn more about \"Eating Healthy Foods: Care Instructions.\"  Current as of: February 28, 2023               Content Version: 13.8    0702-2628 [employer].   Care instructions adapted under license by your healthcare professional. If you have questions about a medical condition or this instruction, always ask your healthcare professional. [employer] disclaims any warranty or liability for your use of this information.      Preventive Care Advice   This is general advice given by our system to help you stay healthy. However, your care team may have specific advice just for you. Please talk to your care team about your " preventive care needs.  Nutrition  Eat 5 or more servings of fruits and vegetables each day.  Try wheat bread, brown rice and whole grain pasta (instead of white bread, rice, and pasta).  Get enough calcium and vitamin D. Check the label on foods and aim for 100% of the RDA (recommended daily allowance).  Lifestyle  Exercise at least 150 minutes each week  (30 minutes a day, 5 days a week).  Do muscle strengthening activities 2 days a week. These help control your weight and prevent disease.  No smoking.  Wear sunscreen to prevent skin cancer.  Have a dental exam and cleaning every 6 months.  Yearly exams  See your health care team every year to talk about:  Any changes in your health.  Any medicines your care team has prescribed.  Preventive care, family planning, and ways to prevent chronic diseases.  Shots (vaccines)   HPV shots (up to age 26), if you've never had them before.  Hepatitis B shots (up to age 59), if you've never had them before.  COVID-19 shot: Get this shot when it's due.  Flu shot: Get a flu shot every year.  Tetanus shot: Get a tetanus shot every 10 years.  Pneumococcal, hepatitis A, and RSV shots: Ask your care team if you need these based on your risk.  Shingles shot (for age 50 and up)  General health tests  Diabetes screening:  Starting at age 35, Get screened for diabetes at least every 3 years.  If you are younger than age 35, ask your care team if you should be screened for diabetes.  Cholesterol test: At age 39, start having a cholesterol test every 5 years, or more often if advised.  Bone density scan (DEXA): At age 50, ask your care team if you should have this scan for osteoporosis (brittle bones).  Hepatitis C: Get tested at least once in your life.  STIs (sexually transmitted infections)  Before age 24: Ask your care team if you should be screened for STIs.  After age 24: Get screened for STIs if you're at risk. You are at risk for STIs (including HIV) if:  You are sexually active  with more than one person.  You don't use condoms every time.  You or a partner was diagnosed with a sexually transmitted infection.  If you are at risk for HIV, ask about PrEP medicine to prevent HIV.  Get tested for HIV at least once in your life, whether you are at risk for HIV or not.  Cancer screening tests  Cervical cancer screening: If you have a cervix, begin getting regular cervical cancer screening tests starting at age 21.  Breast cancer scan (mammogram): If you've ever had breasts, begin having regular mammograms starting at age 40. This is a scan to check for breast cancer.  Colon cancer screening: It is important to start screening for colon cancer at age 45.  Have a colonoscopy test every 10 years (or more often if you're at risk) Or, ask your provider about stool tests like a FIT test every year or Cologuard test every 3 years.  To learn more about your testing options, visit:   https://www.[employer]/643554.pdf.  For help making a decision, visit:   https://bit.ly/rd40774.  Prostate cancer screening test: If you have a prostate, ask your care team if a prostate cancer screening test (PSA) at age 55 is right for you.  Lung cancer screening: If you are a current or former smoker ages 50 to 80, ask your care team if ongoing lung cancer screenings are right for you.  For informational purposes only. Not to replace the advice of your health care provider. Copyright   2023 [facility]. All rights reserved. Clinically reviewed by the [facility]. [employer] [id number] - REV 01/24.

## 2024-02-29 ENCOUNTER — VIRTUAL VISIT (OUTPATIENT)
Dept: FAMILY MEDICINE | Facility: CLINIC | Age: 64
End: 2024-02-29
Payer: COMMERCIAL

## 2024-02-29 DIAGNOSIS — K21.00 GASTROESOPHAGEAL REFLUX DISEASE WITH ESOPHAGITIS WITHOUT HEMORRHAGE: ICD-10-CM

## 2024-02-29 DIAGNOSIS — R11.2 NAUSEA AND VOMITING, UNSPECIFIED VOMITING TYPE: ICD-10-CM

## 2024-02-29 DIAGNOSIS — F33.41 RECURRENT MAJOR DEPRESSIVE DISORDER, IN PARTIAL REMISSION (H): ICD-10-CM

## 2024-02-29 PROCEDURE — 99214 OFFICE O/P EST MOD 30 MIN: CPT | Mod: 95 | Performed by: PHYSICIAN ASSISTANT

## 2024-02-29 PROCEDURE — 96127 BRIEF EMOTIONAL/BEHAV ASSMT: CPT | Mod: 95 | Performed by: PHYSICIAN ASSISTANT

## 2024-02-29 RX ORDER — FAMOTIDINE 20 MG/1
20 TABLET, FILM COATED ORAL EVERY EVENING
Qty: 90 TABLET | Refills: 1 | Status: SHIPPED | OUTPATIENT
Start: 2024-02-29 | End: 2024-08-26

## 2024-02-29 RX ORDER — FLUOXETINE 10 MG/1
10 CAPSULE ORAL DAILY
Qty: 90 CAPSULE | Refills: 1 | Status: SHIPPED | OUTPATIENT
Start: 2024-02-29 | End: 2024-08-26

## 2024-02-29 ASSESSMENT — ANXIETY QUESTIONNAIRES
7. FEELING AFRAID AS IF SOMETHING AWFUL MIGHT HAPPEN: NOT AT ALL
GAD7 TOTAL SCORE: 3
8. IF YOU CHECKED OFF ANY PROBLEMS, HOW DIFFICULT HAVE THESE MADE IT FOR YOU TO DO YOUR WORK, TAKE CARE OF THINGS AT HOME, OR GET ALONG WITH OTHER PEOPLE?: NOT DIFFICULT AT ALL
GAD7 TOTAL SCORE: 3

## 2024-02-29 ASSESSMENT — PATIENT HEALTH QUESTIONNAIRE - PHQ9
10. IF YOU CHECKED OFF ANY PROBLEMS, HOW DIFFICULT HAVE THESE PROBLEMS MADE IT FOR YOU TO DO YOUR WORK, TAKE CARE OF THINGS AT HOME, OR GET ALONG WITH OTHER PEOPLE: NOT DIFFICULT AT ALL
SUM OF ALL RESPONSES TO PHQ QUESTIONS 1-9: 5

## 2024-02-29 NOTE — PROGRESS NOTES
Taisha is a 63 year old who is being evaluated via a billable video visit.      How would you like to obtain your AVS? MyChart  If the video visit is dropped, the invitation should be resent by: Text to cell phone: [phone]  Will anyone else be joining your video visit? No          Assessment & Plan     Recurrent major depressive disorder, in partial remission (H24)    We'll leave the prozac as is for depression.   If you are interested in counseling, please call [phone].   - FLUoxetine (PROZAC) 10 MG capsule; Take 1 capsule (10 mg) by mouth daily    Gastroesophageal reflux disease with esophagitis without hemorrhage  Symptoms improved.     GERD:  let's try to taper off the omeprazole by taking 1 tab every other day for a week or two, then every 3 days and so on.    Continue pepcid (famotidine) every evening.       - famotidine (PEPCID) 20 MG tablet; Take 1 tablet (20 mg) by mouth every evening    Nausea and vomiting, unspecified vomiting type  Continue to use zofran sparingly.  Reviewed potential interaction, which I am not concerned with her at the low dose of prozac and infrequent use of zofran.   - famotidine (PEPCID) 20 MG tablet; Take 1 tablet (20 mg) by mouth every evening      12 minutes spent by me on the date of the encounter doing chart review, history and exam, documentation and further activities per the note            Subjective   Taisha is a 63 year old, presenting for the following health issues:  No chief complaint on file.        2/29/2024     8:26 AM   Additional Questions   Roomed by Trisha   Accompanied by Self         2/29/2024     8:26 AM   Patient Reported Additional Medications   Patient reports taking the following new medications na     Patient with history of depression and anxiety arrived for medication follow-up. PHQ9 and GAD7 completed online.     Uses zofran approximately 6 times since she got it.   Prozac is helping and takes the edge off.    Has not restarted counseling, may  consider in the past.  She has a lot of reservation    Interaction checked:  Low  Drug-Drug: ondansetron and FLUoxetineAdditive serotonergic effects may occur during coadministration of ondansetron and Serotonergic Agents (High Risk), and the risk of developing serotonin syndrome may be increased.  Filtered by system settings      Details    History of Present Illness       Reason for visit:  Follow up    She eats 0-1 servings of fruits and vegetables daily.She consumes 2 sweetened beverage(s) daily.She exercises with enough effort to increase her heart rate 9 or less minutes per day.  She exercises with enough effort to increase her heart rate 3 or less days per week.   She is taking medications regularly.     Depression and Anxiety Follow-Up  How are you doing with your depression since your last visit? No change  How are you doing with your anxiety since your last visit?  No change  Are you having other symptoms that might be associated with depression or anxiety? No  Have you had a significant life event? No   Do you have any concerns with your use of alcohol or other drugs? No    Social History     Tobacco Use    Smoking status: Never    Smokeless tobacco: Never   Vaping Use    Vaping Use: Never used   Substance Use Topics    Alcohol use: Yes    Drug use: No         1/13/2022    11:37 AM 10/26/2023    12:39 PM 2/29/2024     7:48 AM   PHQ   PHQ-9 Total Score 2 4 5   Q9: Thoughts of better off dead/self-harm past 2 weeks Not at all Not at all Not at all         12/16/2019    12:03 PM 3/11/2021    12:53 PM 2/29/2024     7:48 AM   DREW-7 SCORE   Total Score   3 (minimal anxiety)   Total Score 0 0 3         2/29/2024     7:48 AM   Last PHQ-9   1.  Little interest or pleasure in doing things 1   2.  Feeling down, depressed, or hopeless 1   3.  Trouble falling or staying asleep, or sleeping too much 0   4.  Feeling tired or having little energy 1   5.  Poor appetite or overeating 0   6.  Feeling bad about yourself 1    7.  Trouble concentrating 1   8.  Moving slowly or restless 0   Q9: Thoughts of better off dead/self-harm past 2 weeks 0   PHQ-9 Total Score 5         2/29/2024     7:48 AM   DREW-7    1. Feeling nervous, anxious, or on edge 1   2. Not being able to stop or control worrying 1   3. Worrying too much about different things 1   4. Trouble relaxing 0   5. Being so restless that it is hard to sit still 0   6. Becoming easily annoyed or irritable 0   7. Feeling afraid, as if something awful might happen 0   DREW-7 Total Score 3   If you checked any problems, how difficult have they made it for you to do your work, take care of things at home, or get along with other people? Not difficult at all       Suicide Assessment Five-step Evaluation and Treatment (SAFE-T)            Review of Systems  Constitutional, neuro, ENT, endocrine, pulmonary, cardiac, gastrointestinal, genitourinary, musculoskeletal, integument and psychiatric systems are negative, except as otherwise noted.      Objective           Vitals:  No vitals were obtained today due to virtual visit.    Physical Exam   GENERAL: alert and no distress  EYES: Eyes grossly normal to inspection.  No discharge or erythema, or obvious scleral/conjunctival abnormalities.  RESP: No audible wheeze, cough, or visible cyanosis.    SKIN: Visible skin clear. No significant rash, abnormal pigmentation or lesions.  NEURO: Cranial nerves grossly intact.  Mentation and speech appropriate for age.  PSYCH: Appropriate affect, tone, and pace of words          Video-Visit Details    Type of service:  Video Visit     Originating Location (pt. Location): Home    Distant Location (provider location):  On-site  Platform used for Video Visit: Francesca  Signed Electronically by: Cathryn Hills PA-C

## 2024-02-29 NOTE — PATIENT INSTRUCTIONS
Taisha,   We'll leave the prozac as is for depression.   If you are interested in counseling, please call [phone].     GERD:  let's try to taper off the omeprazole by taking 1 tab every other day for a week or two, then every 3 days and so on.    Continue pepcid (famotidine) every evening.

## 2024-03-18 DIAGNOSIS — R11.2 NAUSEA AND VOMITING, UNSPECIFIED VOMITING TYPE: ICD-10-CM

## 2024-03-19 RX ORDER — ONDANSETRON 4 MG/1
TABLET, ORALLY DISINTEGRATING ORAL
Qty: 18 TABLET | Refills: 2 | Status: SHIPPED | OUTPATIENT
Start: 2024-03-19

## 2024-08-14 ASSESSMENT — PATIENT HEALTH QUESTIONNAIRE - PHQ9
10. IF YOU CHECKED OFF ANY PROBLEMS, HOW DIFFICULT HAVE THESE PROBLEMS MADE IT FOR YOU TO DO YOUR WORK, TAKE CARE OF THINGS AT HOME, OR GET ALONG WITH OTHER PEOPLE: NOT DIFFICULT AT ALL
SUM OF ALL RESPONSES TO PHQ QUESTIONS 1-9: 4
SUM OF ALL RESPONSES TO PHQ QUESTIONS 1-9: 4

## 2024-08-15 ENCOUNTER — VIRTUAL VISIT (OUTPATIENT)
Dept: FAMILY MEDICINE | Facility: CLINIC | Age: 64
End: 2024-08-15
Payer: COMMERCIAL

## 2024-08-15 DIAGNOSIS — A08.4 VIRAL GASTROENTERITIS: Primary | ICD-10-CM

## 2024-08-15 DIAGNOSIS — J06.9 UPPER RESPIRATORY TRACT INFECTION, UNSPECIFIED TYPE: ICD-10-CM

## 2024-08-15 PROCEDURE — 99213 OFFICE O/P EST LOW 20 MIN: CPT | Mod: 95 | Performed by: PHYSICIAN ASSISTANT

## 2024-08-15 NOTE — PROGRESS NOTES
Taisha is a 63 year old who is being evaluated via a billable video visit.    How would you like to obtain your AVS? MyChart  If the video visit is dropped, the invitation should be resent by: Text to cell phone: [phone]  Will anyone else be joining your video visit? No      Assessment & Plan     Viral gastroenteritis      Patient was educated on causes of gastroenteritis and the natural course of this illness.  I reviewed the importance of hydration with appropriate solute.  Avoidance of dairy may be reasonable for a few days.   I recommend patient avoid anti-diarrheal medications, such as immodium, for the time being.    Diet: BRAT diet and advance diet as tolerated      I reviewed the signs and symptoms associated with dehydration or more severe illness including fever and listlessness.  Call or return immediately if these occur.      URI (Upper Respiratory Infection)  (primary encounter diagnosis)    I discussed the pathophysiology of upper respiratory infections and likely viral etiology.   Discussed general respiratory tract infection care including importance of hydration, rest, over the counter therapies and techniques to prevent future infection as well as transmission to others.  Discussed signs or symptoms that would indicate need for recheck.    Patient was instructed to f/u or call if symptoms worsen or fail to improve as anticipated.              Subjective   Taisha is a 63 year old, presenting for the following health issues:  No chief complaint on file.        8/15/2024     8:25 AM   Additional Questions   Roomed by Trisha   Accompanied by Self check in       Patient arrived to discuss listed symptoms below;     Digestive issues. Thursday. Diarrhea, Saturday. Vomiting, Sunday. fever peaked at 101.5. Low grade fever off and on Tuesday. Neg. Covid test on Monday. On and off nausea and a full feeling continues. Tired, sleeping a lot. Headache off and on -facial and sinus pressure.     Symptoms started  Thursday late evening, she had a big event at her house Friday. Ringle better Friday and then had an event Friday/Saturday and symptoms worsened on Sunday.     No coughing.   Symptoms are more congestion/sinus pressure.  Some pain in jaw/teeth.    Still has lingering nausea that comes and goes. Has tried zofran PRN.  Tried a scopolamine patch that she had left over.     She is sleeping a lot.    No fever for a couple days.     Has tried flonase and benadryl at night.  Had a small scab after using flonase.         History of Present Illness       Reason for visit:  Digestion    She eats 0-1 servings of fruits and vegetables daily.She consumes 1 sweetened beverage(s) daily.She exercises with enough effort to increase her heart rate 60 or more minutes per day.  She exercises with enough effort to increase her heart rate 5 days per week.   She is taking medications regularly.               Review of Systems  Constitutional, HEENT, cardiovascular, pulmonary, gi and gu systems are negative, except as otherwise noted.      Objective           Vitals:  No vitals were obtained today due to virtual visit.    Physical Exam   GENERAL: alert and no distress  EYES: Eyes grossly normal to inspection.  No discharge or erythema, or obvious scleral/conjunctival abnormalities.  RESP: No audible wheeze, cough, or visible cyanosis.    SKIN: Visible skin clear. No significant rash, abnormal pigmentation or lesions.  NEURO: Cranial nerves grossly intact.  Mentation and speech appropriate for age.  PSYCH: Appropriate affect, tone, and pace of words          Video-Visit Details    Type of service:  Video Visit   Originating Location (pt. Location): Home    Distant Location (provider location):  On-site  Platform used for Video Visit: Francesca  Signed Electronically by: Cathryn Hills PA-C

## 2024-08-24 DIAGNOSIS — R11.2 NAUSEA AND VOMITING, UNSPECIFIED VOMITING TYPE: ICD-10-CM

## 2024-08-24 DIAGNOSIS — K21.00 GASTROESOPHAGEAL REFLUX DISEASE WITH ESOPHAGITIS WITHOUT HEMORRHAGE: ICD-10-CM

## 2024-08-24 DIAGNOSIS — F33.41 RECURRENT MAJOR DEPRESSIVE DISORDER, IN PARTIAL REMISSION (H): ICD-10-CM

## 2024-08-26 RX ORDER — FLUOXETINE 10 MG/1
10 CAPSULE ORAL DAILY
Qty: 90 CAPSULE | Refills: 0 | Status: SHIPPED | OUTPATIENT
Start: 2024-08-26

## 2024-08-26 RX ORDER — FAMOTIDINE 20 MG/1
20 TABLET, FILM COATED ORAL EVERY MORNING
Qty: 90 TABLET | Refills: 0 | Status: SHIPPED | OUTPATIENT
Start: 2024-08-26

## 2024-11-21 DIAGNOSIS — F33.41 RECURRENT MAJOR DEPRESSIVE DISORDER, IN PARTIAL REMISSION (H): ICD-10-CM

## 2024-11-21 DIAGNOSIS — R11.2 NAUSEA AND VOMITING, UNSPECIFIED VOMITING TYPE: ICD-10-CM

## 2024-11-21 DIAGNOSIS — K21.00 GASTROESOPHAGEAL REFLUX DISEASE WITH ESOPHAGITIS WITHOUT HEMORRHAGE: ICD-10-CM

## 2024-11-21 RX ORDER — FLUOXETINE 10 MG/1
10 CAPSULE ORAL DAILY
Qty: 90 CAPSULE | Refills: 0 | Status: SHIPPED | OUTPATIENT
Start: 2024-11-21

## 2024-11-21 RX ORDER — FAMOTIDINE 20 MG/1
20 TABLET, FILM COATED ORAL EVERY MORNING
Qty: 90 TABLET | Refills: 0 | Status: SHIPPED | OUTPATIENT
Start: 2024-11-21

## 2025-01-02 ENCOUNTER — E-VISIT (OUTPATIENT)
Dept: FAMILY MEDICINE | Facility: CLINIC | Age: 65
End: 2025-01-02
Payer: COMMERCIAL

## 2025-01-02 DIAGNOSIS — R11.2 NAUSEA AND VOMITING, UNSPECIFIED VOMITING TYPE: ICD-10-CM

## 2025-01-02 RX ORDER — ONDANSETRON 4 MG/1
4 TABLET, ORALLY DISINTEGRATING ORAL EVERY 8 HOURS PRN
Qty: 18 TABLET | Refills: 1 | Status: SHIPPED | OUTPATIENT
Start: 2025-01-02

## 2025-01-02 NOTE — PATIENT INSTRUCTIONS
I am glad you are doing well. I have refilled your medication:  Orders Placed This Encounter   Medications     ondansetron (ZOFRAN ODT) 4 MG ODT tab     Sig: Take 1 tablet (4 mg) by mouth every 8 hours as needed for nausea.     Dispense:  18 tablet     Refill:  1        View your full visit summary for details by clicking on the link below. Your pharmacist will be able to address any questions you may have about the medication.      Thank you for choosing us for your care.

## 2025-01-21 ENCOUNTER — OFFICE VISIT (OUTPATIENT)
Dept: DERMATOLOGY | Facility: CLINIC | Age: 65
End: 2025-01-21
Payer: COMMERCIAL

## 2025-01-21 DIAGNOSIS — D22.9 MULTIPLE NEVI: ICD-10-CM

## 2025-01-21 DIAGNOSIS — L82.1 SK (SEBORRHEIC KERATOSIS): Primary | ICD-10-CM

## 2025-01-21 DIAGNOSIS — L72.9 CYST OF SKIN: ICD-10-CM

## 2025-01-21 PROCEDURE — 99213 OFFICE O/P EST LOW 20 MIN: CPT | Performed by: DERMATOLOGY

## 2025-01-21 ASSESSMENT — PAIN SCALES - GENERAL: PAINLEVEL_OUTOF10: MILD PAIN (2)

## 2025-01-21 NOTE — LETTER
1/21/2025       RE: Taisha Yanez  [address]  [facility] [address]     Dear Colleague,    Thank you for referring your patient, Taisha Yanez, to the [facility] [place] at [facility]. Please see a copy of my visit note below.    [facility] Dermatology Note  Encounter Date: Jan 21, 2025  Office Visit     Dermatology Problem List:  1. Seborrheic Keratoses; scalp, chest, back, upper and lower extremities  2. Cyst, unspecified; left posterior shoulder  ____________________________________________    Assessment & Plan:    * Reviewed prior external note(s): Dayo information from [facility] (01/15/2015) reviewed.    # Seborrheic keratosis, non irritated; scalp, chest, back, upper and lower extremities   - Discussed option of cryotherapy for treatment if inflamed. Patient elected to monitor and make decision on treatment in the future.   - Seborrheic keratosis (seb-o-REE-ik care-uh-TOE-sis) is a common skin growth. It may seem worrisome because it can look like a wart, pre-cancerous skin growth (actinic keratosis), or skin cancer. Despite their appearance, seborrheic keratoses are harmless.    # Cyst, unspecified; left posterior shoulder  # Probable epidermal cyst   - Discussed benign nature of lesion's appearance. If lesion becomes bothersome, discussed option of removal during procedure appointment. Discussed that patient should return sooner if the cyst becomes more bothersome or shows concerning changes (size, pain, etc).   - Continue to monitor; discussed avoidance of excoriation/manipulation prior to procedure should they elect in the future for removal.    # Benign lesions (Solar lentigines, cherry angiomas, nevi)   - No lesions appear pre-cancerous at today's visit on inspection. Informed patient that many of these changes are associated with sun exposure. Recommended a mineral-based  sunscreen.   - Continue to monitor for new / changing lesions.     Learning the ABCDEs or Melanomas / Self Skin checks    A - Asymmetry  This benign mole is not asymmetrical. If you draw a line through the middle, the two sides will match, meaning it is symmetrical. If you draw a line through this mole, the two halves will not match, meaning it is asymmetrical, a warning sign for melanoma.  B - Border  A benign mole has smooth, even borders, unlike melanomas. The borders of an early melanoma tend to be uneven. The edges may be scalloped or notched.  C - Color  Most benign moles are all one color -- often a single shade of brown. Having a variety of colors is another warning signal. A number of different shades of brown, tan or black could appear. A melanoma may also become red, white or blue.  D - Diameter  Benign moles usually have a smaller diameter than malignant ones. Melanomas usually are larger in diameter than the eraser on your pencil tip (  inch or 6mm), but they may sometimes be smaller when first detected.  E - Evolution  Common, benign moles look the same over time. Be on the alert when a mole starts to evolve or change in any way. When a mole is evolving, see a doctor. Any change -- in size, shape, color, elevation, or another trait, or any new symptom such as bleeding, itching or crusting -- points to danger.      Procedures Performed:   None    Follow-up: 1 year(s) in-person, or earlier for new or changing lesions    Staff and Medical Student:     Dmitri Figueredo    I was present with the medical student who participated in the service and in the documentation of the note. I have verified the history and personally performed the physical exam and medical decision making. I agree with the assessment and plan of care as documented in the note.  Genevieve Zuleta MD   ____________________________________________    CC: No chief complaint on file.    HPI:  Ms. Taisha Yanez is a(n) 64 year old female who  "presents today as a return patient for a full body skin exam.    States she previously had x3 \"Skin tags\" removed by a previous dermatologist which are doing well.    Reports multiple raised \"spots\" on the back and the chest that can become caught or snagged on clothing articles. Reports that the lesion on the sternal chest \"came off with a bandaid\". Describes mild pain associated with irritation of these spots.    Patient reports a bump on their back left shoulder that they can \"squeeze stuff out of\". Patient reports manipulating the lesion but denies itching. Expresses concern for an infected cyst as their father had an infected cyst on their back. Not bothersome today but endorses still \"playing\" with the spot at times.    Patient is otherwise feeling well, without additional skin concerns.    Labs:  None reviewed.    Physical Exam:  Vitals: There were no vitals taken for this visit.  SKIN: Total skin excluding the undergarment areas was performed. The exam included the head/face, neck, both arms, chest, back, abdomen, both legs, digits and/or nails.   - Scalp, back, chest, and bilateral upper and lower extremities show stuck-on, waxy brown/tan papules and patches  - Left posterior shoulder shows a flesh-colored nodule with signs of linear excoriation and mild erythema immediately after manipulation by patient.  - Chest, back, and upper and lower extremities show small (>1cm) scattered cherry-red dome-shaped lesions.  - No evidence of precancerous lesions on chest, back, face, upper extremities, lower extremities, or bilateral feet appreciated at today's visit.  - No other lesions of concern on areas examined.     Medications:  Current Outpatient Medications   Medication Sig Dispense Refill     dicyclomine (BENTYL) 20 MG tablet TAKE ONE TABLET BY MOUTH THREE TIMES A DAY WITH MEALS AS NEEDED. MAY ALSO TAKE BEFORE BED 90 tablet 0     famotidine (PEPCID) 20 MG tablet TAKE ONE TABLET BY MOUTH EVERY EVENING 90 tablet " "0     FLUoxetine (PROZAC) 10 MG capsule TAKE ONE CAPSULE BY MOUTH DAILY 90 capsule 0     ondansetron (ZOFRAN ODT) 4 MG ODT tab Take 1 tablet (4 mg) by mouth every 8 hours as needed for nausea. 18 tablet 1     scopolamine (TRANSDERM) 1 MG/3DAYS 72 hr patch Place 1 patch onto the skin every 72 hours 4 patch 0     No current facility-administered medications for this visit.      Past Medical History:   Patient Active Problem List   Diagnosis     Cellulitis of foot, right     TMJ (temporomandibular joint syndrome)     Moderate major depression (H)     Anxiety     Chronic sinusitis, unspecified location     Atrophic vaginitis     Gastroesophageal reflux disease with esophagitis     IBS (irritable bowel syndrome)     Seasonal allergies     Primary osteoarthritis of left knee     Abdominal spasms     Family history of breast cancer in first degree relative     Hiatal hernia     Osteoarthritis resulting from left hip dysplasia     Family history of thyroid disease     H/O motion sickness     Past Medical History:   Diagnosis Date     Arthritis      Depressive disorder      PONV (postoperative nausea and vomiting)         CC Genevieve Zuleta MD  [address] [facility] [address]  [place] [address] on close of this encounter.    --   Seborrheic keratosis     Seborrheic keratosis is a condition that causes wart-like growths on the skin. The growths are noncancerous (benign).     Causes  Seborrheic keratosis is a benign form of skin tumor. The cause is unknown.  The condition commonly appears after age 40. It tends to run in families.    Symptoms  Symptoms of seborrheic keratosis are skin growths that:  Are located on the face, chest, shoulders, back, or other areas   Are painless, but may become irritated and itch   Are most often tan, brown, or black   Have a slightly raised, flat surface   May have a rough texture (like a wart)   Often have a waxy surface   Are round or oval in shape   May look \"pasted-on\"   Often appear in " clusters     Exams and Tests  Your health care provider will look at the growths to determine if you have the condition. You may need a skin biopsy to confirm the diagnosis.    Treatment  You usually do not need treatment unless growths get irritated or affect your appearance.  Growths may be removed with surgery or freezing (cryotherapy).    [place] (Prognosis)  Removing the growths is simple and usually does not cause scars. You may have patches of lighter skin where growths on the torso have been removed.   Growths usually do not return after they are removed. You may develop more growths in the future if you are prone to the condition.    Possible Complications  These complications may occur:  Irritation, bleeding, or discomfort of growths   Mistake in diagnosis (growths may look like skin cancer tumors)   Distress due to physical appearance   Many growths that come on suddenly (may be a sign of cancer inside the body)     When to Contact a Medical Professional  Call your health care provider if you have symptoms of seborrheic keratosis.  Also call if you have new symptoms, such as:  A change in the appearance of the skin growth   New growths   A growth that looks like a seborrheic keratosis, but occurs by itself or has ragged borders and irregular color. (Your health care provider will need to examine it for skin cancer.)    Alternative Names  Benign skin tumors - keratosis; Keratosis - seborrheic; Senile keratosis    References  Amrit BARROS. Management of benign skin lesions commonly affecting the face: actinic keratosis, seborrheic keratosis, and rosacea. Curr Opin Otolaryngol Head Neck Surg  Guilherme WD, Diaz TG, Aubrey DM, eds. [place]' Diseases of the Skin: Clinical Dermatology. 11th ed. [place], [place]: Christiano Redd; 2011:chap 29.  Olvin L, Olvin C, Monica TRAORE. Benign epidermal tumors and proliferations. In: Juve HOLMAN, Orion JL, Marjorie JV, et al, eds. Dermatology. 3rd ed. [place], [place]:  Christiano Barillas; 2012:chap 109.  Update Date 11/14/2014  Updated by: Macho Reinoso MD, dermatologist in private practice, [place], [place]. Review provided by [employer]. Also reviewed by David Zieve, MD, A, Natacha Solis, PhD, and the A.D.A.M. Editorial team.      Again, thank you for allowing me to participate in the care of your patient.      Sincerely,    Genevieve Zuleta MD

## 2025-01-21 NOTE — NURSING NOTE
Dermatology Rooming Note    Chanel Renata's goals for this visit include:   Chief Complaint   Patient presents with    Skin Check     Taisha is here today for a routine FBSE; Spots of concern on chest and back.     Clarence Gasca, Visit Facilitator

## 2025-01-21 NOTE — PROGRESS NOTES
[facility] Dermatology Note  Encounter Date: Jan 21, 2025  Office Visit     Dermatology Problem List:  1. Seborrheic Keratoses; scalp, chest, back, upper and lower extremities  2. Cyst, unspecified; left posterior shoulder  ____________________________________________    Assessment & Plan:    * Reviewed prior external note(s): Dayo information from [facility] (01/15/2015) reviewed.    # Seborrheic keratosis, non irritated; scalp, chest, back, upper and lower extremities   - Discussed option of cryotherapy for treatment if inflamed. Patient elected to monitor and make decision on treatment in the future.   - Seborrheic keratosis (seb-o-REE-ik care-uh-TOE-sis) is a common skin growth. It may seem worrisome because it can look like a wart, pre-cancerous skin growth (actinic keratosis), or skin cancer. Despite their appearance, seborrheic keratoses are harmless.    # Cyst, unspecified; left posterior shoulder  # Probable epidermal cyst   - Discussed benign nature of lesion's appearance. If lesion becomes bothersome, discussed option of removal during procedure appointment. Discussed that patient should return sooner if the cyst becomes more bothersome or shows concerning changes (size, pain, etc).   - Continue to monitor; discussed avoidance of excoriation/manipulation prior to procedure should they elect in the future for removal.    # Benign lesions (Solar lentigines, cherry angiomas, nevi)   - No lesions appear pre-cancerous at today's visit on inspection. Informed patient that many of these changes are associated with sun exposure. Recommended a mineral-based sunscreen.   - Continue to monitor for new / changing lesions.     Learning the ABCDEs or Melanomas / Self Skin checks    A - Asymmetry  This benign mole is not asymmetrical. If you draw a line through the middle, the two sides will match, meaning it is symmetrical. If you draw a line through this mole, the two halves  "will not match, meaning it is asymmetrical, a warning sign for melanoma.  B - Border  A benign mole has smooth, even borders, unlike melanomas. The borders of an early melanoma tend to be uneven. The edges may be scalloped or notched.  C - Color  Most benign moles are all one color -- often a single shade of brown. Having a variety of colors is another warning signal. A number of different shades of brown, tan or black could appear. A melanoma may also become red, white or blue.  D - Diameter  Benign moles usually have a smaller diameter than malignant ones. Melanomas usually are larger in diameter than the eraser on your pencil tip (  inch or 6mm), but they may sometimes be smaller when first detected.  E - Evolution  Common, benign moles look the same over time. Be on the alert when a mole starts to evolve or change in any way. When a mole is evolving, see a doctor. Any change -- in size, shape, color, elevation, or another trait, or any new symptom such as bleeding, itching or crusting -- points to danger.      Procedures Performed:   None    Follow-up: 1 year(s) in-person, or earlier for new or changing lesions    Staff and Medical Student:     Dmitri Figueredo    I was present with the medical student who participated in the service and in the documentation of the note. I have verified the history and personally performed the physical exam and medical decision making. I agree with the assessment and plan of care as documented in the note.  Genevieve Zuleta MD   ____________________________________________    CC: No chief complaint on file.    HPI:  Ms. Taisha Yanez is a(n) 64 year old female who presents today as a return patient for a full body skin exam.    States she previously had x3 \"Skin tags\" removed by a previous dermatologist which are doing well.    Reports multiple raised \"spots\" on the back and the chest that can become caught or snagged on clothing articles. Reports that the lesion on the sternal " "chest \"came off with a bandaid\". Describes mild pain associated with irritation of these spots.    Patient reports a bump on their back left shoulder that they can \"squeeze stuff out of\". Patient reports manipulating the lesion but denies itching. Expresses concern for an infected cyst as their father had an infected cyst on their back. Not bothersome today but endorses still \"playing\" with the spot at times.    Patient is otherwise feeling well, without additional skin concerns.    Labs:  None reviewed.    Physical Exam:  Vitals: There were no vitals taken for this visit.  SKIN: Total skin excluding the undergarment areas was performed. The exam included the head/face, neck, both arms, chest, back, abdomen, both legs, digits and/or nails.   - Scalp, back, chest, and bilateral upper and lower extremities show stuck-on, waxy brown/tan papules and patches  - Left posterior shoulder shows a flesh-colored nodule with signs of linear excoriation and mild erythema immediately after manipulation by patient.  - Chest, back, and upper and lower extremities show small (>1cm) scattered cherry-red dome-shaped lesions.  - No evidence of precancerous lesions on chest, back, face, upper extremities, lower extremities, or bilateral feet appreciated at today's visit.  - No other lesions of concern on areas examined.     Medications:  Current Outpatient Medications   Medication Sig Dispense Refill    dicyclomine (BENTYL) 20 MG tablet TAKE ONE TABLET BY MOUTH THREE TIMES A DAY WITH MEALS AS NEEDED. MAY ALSO TAKE BEFORE BED 90 tablet 0    famotidine (PEPCID) 20 MG tablet TAKE ONE TABLET BY MOUTH EVERY EVENING 90 tablet 0    FLUoxetine (PROZAC) 10 MG capsule TAKE ONE CAPSULE BY MOUTH DAILY 90 capsule 0    ondansetron (ZOFRAN ODT) 4 MG ODT tab Take 1 tablet (4 mg) by mouth every 8 hours as needed for nausea. 18 tablet 1    scopolamine (TRANSDERM) 1 MG/3DAYS 72 hr patch Place 1 patch onto the skin every 72 hours 4 patch 0     No current " "facility-administered medications for this visit.      Past Medical History:   Patient Active Problem List   Diagnosis    Cellulitis of foot, right    TMJ (temporomandibular joint syndrome)    Moderate major depression (H)    Anxiety    Chronic sinusitis, unspecified location    Atrophic vaginitis    Gastroesophageal reflux disease with esophagitis    IBS (irritable bowel syndrome)    Seasonal allergies    Primary osteoarthritis of left knee    Abdominal spasms    Family history of breast cancer in first degree relative    Hiatal hernia    Osteoarthritis resulting from left hip dysplasia    Family history of thyroid disease    H/O motion sickness     Past Medical History:   Diagnosis Date    Arthritis     Depressive disorder     PONV (postoperative nausea and vomiting)         CC Genevieve Zuleta MD  [address]  [place] [address] on close of this encounter.    --   Seborrheic keratosis     Seborrheic keratosis is a condition that causes wart-like growths on the skin. The growths are noncancerous (benign).     Causes  Seborrheic keratosis is a benign form of skin tumor. The cause is unknown.  The condition commonly appears after age 40. It tends to run in families.    Symptoms  Symptoms of seborrheic keratosis are skin growths that:  Are located on the face, chest, shoulders, back, or other areas   Are painless, but may become irritated and itch   Are most often tan, brown, or black   Have a slightly raised, flat surface   May have a rough texture (like a wart)   Often have a waxy surface   Are round or oval in shape   May look \"pasted-on\"   Often appear in clusters     Exams and Tests  Your health care provider will look at the growths to determine if you have the condition. You may need a skin biopsy to confirm the diagnosis.    Treatment  You usually do not need treatment unless growths get irritated or affect your appearance.  Growths may be removed with surgery or freezing " (cryotherapy).    [place] (Prognosis)  Removing the growths is simple and usually does not cause scars. You may have patches of lighter skin where growths on the torso have been removed.   Growths usually do not return after they are removed. You may develop more growths in the future if you are prone to the condition.    Possible Complications  These complications may occur:  Irritation, bleeding, or discomfort of growths   Mistake in diagnosis (growths may look like skin cancer tumors)   Distress due to physical appearance   Many growths that come on suddenly (may be a sign of cancer inside the body)     When to Contact a Medical Professional  Call your health care provider if you have symptoms of seborrheic keratosis.  Also call if you have new symptoms, such as:  A change in the appearance of the skin growth   New growths   A growth that looks like a seborrheic keratosis, but occurs by itself or has ragged borders and irregular color. (Your health care provider will need to examine it for skin cancer.)    Alternative Names  Benign skin tumors - keratosis; Keratosis - seborrheic; Senile keratosis    References  Amrit BARROS. Management of benign skin lesions commonly affecting the face: actinic keratosis, seborrheic keratosis, and rosacea. Curr Opin Otolaryngol Head Neck Surg  Guilherme WD, Diaz TG, Aubrey DM, eds. [place]' Diseases of the Skin: Clinical Dermatology. 11th ed. [place], [place]: Christiano Redd; 2011:chap 29.  Olvin L, Olvin C, Monica CJ. Benign epidermal tumors and proliferations. In: Juve HOLMAN, Orion JL, Marjorie JV, et al, eds. Dermatology. 3rd ed. [place], [place]: Christiano Barillas; 2012:chap 109.  Update Date 11/14/2014  Updated by: Macho Reinoso MD, dermatologist in private practice, [place], [place]. Review provided by [employer]. Also reviewed by David Zieve, MD, A, Natacha Solis, PhD, and the A.D.A.M. Editorial team.

## 2025-02-13 ENCOUNTER — E-VISIT (OUTPATIENT)
Dept: FAMILY MEDICINE | Facility: CLINIC | Age: 65
End: 2025-02-13
Payer: COMMERCIAL

## 2025-02-13 DIAGNOSIS — F33.41 RECURRENT MAJOR DEPRESSIVE DISORDER, IN PARTIAL REMISSION: Primary | ICD-10-CM

## 2025-02-23 DIAGNOSIS — F33.41 RECURRENT MAJOR DEPRESSIVE DISORDER, IN PARTIAL REMISSION: ICD-10-CM

## 2025-02-23 DIAGNOSIS — K21.00 GASTROESOPHAGEAL REFLUX DISEASE WITH ESOPHAGITIS WITHOUT HEMORRHAGE: ICD-10-CM

## 2025-02-23 DIAGNOSIS — R11.2 NAUSEA AND VOMITING, UNSPECIFIED VOMITING TYPE: ICD-10-CM

## 2025-02-24 RX ORDER — FLUOXETINE 10 MG/1
10 CAPSULE ORAL DAILY
Qty: 90 CAPSULE | Refills: 0 | Status: SHIPPED | OUTPATIENT
Start: 2025-02-24

## 2025-02-24 RX ORDER — FAMOTIDINE 20 MG/1
20 TABLET, FILM COATED ORAL EVERY MORNING
Qty: 90 TABLET | Refills: 0 | Status: SHIPPED | OUTPATIENT
Start: 2025-02-24

## 2025-03-08 ENCOUNTER — HEALTH MAINTENANCE LETTER (OUTPATIENT)
Age: 65
End: 2025-03-08

## 2025-05-16 ENCOUNTER — RESULTS FOLLOW-UP (OUTPATIENT)
Dept: FAMILY MEDICINE | Facility: CLINIC | Age: 65
End: 2025-05-16

## 2025-05-16 DIAGNOSIS — N30.01 ACUTE CYSTITIS WITH HEMATURIA: Primary | ICD-10-CM

## 2025-05-22 ENCOUNTER — ANCILLARY PROCEDURE (OUTPATIENT)
Dept: MAMMOGRAPHY | Facility: CLINIC | Age: 65
End: 2025-05-22
Attending: PHYSICIAN ASSISTANT
Payer: COMMERCIAL

## 2025-05-22 DIAGNOSIS — Z12.31 VISIT FOR SCREENING MAMMOGRAM: ICD-10-CM

## 2025-05-22 PROCEDURE — 77063 BREAST TOMOSYNTHESIS BI: CPT

## 2025-07-16 ENCOUNTER — MYC MEDICAL ADVICE (OUTPATIENT)
Dept: FAMILY MEDICINE | Facility: CLINIC | Age: 65
End: 2025-07-16
Payer: COMMERCIAL

## 2025-07-16 NOTE — TELEPHONE ENCOUNTER
Ok to use provider approval or same day slot, needs to be in person.  If symptoms stable, ok to wait for my return.  If she prefers to have it checked sooner, please assist in getting her in with a same day provider (in person visit)    Thank you  Cathryn Hills PA-C

## (undated) RX ORDER — LIDOCAINE HYDROCHLORIDE 10 MG/ML
INJECTION, SOLUTION EPIDURAL; INFILTRATION; INTRACAUDAL; PERINEURAL
Status: DISPENSED
Start: 2021-04-22

## (undated) RX ORDER — PROPOFOL 10 MG/ML
INJECTION, EMULSION INTRAVENOUS
Status: DISPENSED
Start: 2021-04-22